# Patient Record
Sex: FEMALE | Race: WHITE | Employment: OTHER
[De-identification: names, ages, dates, MRNs, and addresses within clinical notes are randomized per-mention and may not be internally consistent; named-entity substitution may affect disease eponyms.]

---

## 2022-03-18 PROBLEM — E66.01 OBESITY, MORBID (HCC): Status: ACTIVE | Noted: 2018-01-08

## 2022-04-28 PROBLEM — I35.0 NONRHEUMATIC AORTIC VALVE STENOSIS: Status: ACTIVE | Noted: 2022-04-28

## 2022-06-08 ENCOUNTER — TELEPHONE (OUTPATIENT)
Dept: FAMILY MEDICINE CLINIC | Facility: CLINIC | Age: 72
End: 2022-06-08

## 2022-06-08 DIAGNOSIS — E11.9 TYPE 2 DIABETES MELLITUS WITHOUT COMPLICATION, WITHOUT LONG-TERM CURRENT USE OF INSULIN (HCC): Primary | ICD-10-CM

## 2022-06-15 ENCOUNTER — TELEPHONE (OUTPATIENT)
Dept: CARDIOLOGY CLINIC | Age: 72
End: 2022-06-15

## 2022-06-15 RX ORDER — METOPROLOL SUCCINATE 50 MG/1
50 TABLET, EXTENDED RELEASE ORAL DAILY
Qty: 90 TABLET | Refills: 3 | Status: SHIPPED | OUTPATIENT
Start: 2022-06-15 | End: 2022-06-27 | Stop reason: SDUPTHER

## 2022-06-15 NOTE — TELEPHONE ENCOUNTER
Requested Prescriptions     Signed Prescriptions Disp Refills    metoprolol succinate (TOPROL XL) 50 MG extended release tablet 90 tablet 3     Sig: Take 1 tablet by mouth daily     Authorizing Provider: Shellie Rosen     Ordering User: Breana Suárez

## 2022-06-20 ENCOUNTER — NURSE ONLY (OUTPATIENT)
Dept: FAMILY MEDICINE CLINIC | Facility: CLINIC | Age: 72
End: 2022-06-20

## 2022-06-20 DIAGNOSIS — E11.9 TYPE 2 DIABETES MELLITUS WITHOUT COMPLICATION, WITHOUT LONG-TERM CURRENT USE OF INSULIN (HCC): ICD-10-CM

## 2022-06-21 LAB
CREAT UR-MCNC: 23 MG/DL
MICROALBUMIN UR-MCNC: 0.61 MG/DL
MICROALBUMIN/CREAT UR-RTO: 27 MG/G

## 2022-06-22 ENCOUNTER — TELEPHONE (OUTPATIENT)
Dept: FAMILY MEDICINE CLINIC | Facility: CLINIC | Age: 72
End: 2022-06-22

## 2022-06-22 DIAGNOSIS — E11.9 TYPE 2 DIABETES MELLITUS WITHOUT COMPLICATION, WITHOUT LONG-TERM CURRENT USE OF INSULIN (HCC): Primary | ICD-10-CM

## 2022-06-22 DIAGNOSIS — I27.20 PULMONARY HTN (HCC): ICD-10-CM

## 2022-06-22 NOTE — TELEPHONE ENCOUNTER
Tried calling pt about the A1C, CMP, and Lipid panel that was missed in the lab draw.  Left message to call back

## 2022-06-23 NOTE — TELEPHONE ENCOUNTER
Spoke with pt. Pt states that when she came they could not get her labs this is why they did not get labs.

## 2022-06-24 ENCOUNTER — NURSE ONLY (OUTPATIENT)
Dept: FAMILY MEDICINE CLINIC | Facility: CLINIC | Age: 72
End: 2022-06-24

## 2022-06-24 DIAGNOSIS — E11.9 TYPE 2 DIABETES MELLITUS WITHOUT COMPLICATION, WITHOUT LONG-TERM CURRENT USE OF INSULIN (HCC): ICD-10-CM

## 2022-06-24 LAB
CHOLEST SERPL-MCNC: 148 MG/DL
HDLC SERPL-MCNC: 56 MG/DL (ref 40–60)
HDLC SERPL: 2.6 {RATIO}
LDLC SERPL CALC-MCNC: 78.4 MG/DL
TRIGL SERPL-MCNC: 68 MG/DL (ref 35–150)
VLDLC SERPL CALC-MCNC: 13.6 MG/DL (ref 6–23)

## 2022-06-25 LAB
EST. AVERAGE GLUCOSE BLD GHB EST-MCNC: 163 MG/DL
HBA1C MFR BLD: 7.3 % (ref 4.2–6.3)

## 2022-06-27 ENCOUNTER — OFFICE VISIT (OUTPATIENT)
Dept: FAMILY MEDICINE CLINIC | Facility: CLINIC | Age: 72
End: 2022-06-27
Payer: MEDICARE

## 2022-06-27 VITALS
BODY MASS INDEX: 51.21 KG/M2 | DIASTOLIC BLOOD PRESSURE: 87 MMHG | RESPIRATION RATE: 16 BRPM | OXYGEN SATURATION: 98 % | SYSTOLIC BLOOD PRESSURE: 170 MMHG | TEMPERATURE: 97.2 F | HEART RATE: 99 BPM | WEIGHT: 280 LBS

## 2022-06-27 DIAGNOSIS — L03.119 CELLULITIS OF LOWER EXTREMITY, UNSPECIFIED LATERALITY: ICD-10-CM

## 2022-06-27 DIAGNOSIS — E11.9 TYPE 2 DIABETES MELLITUS WITHOUT COMPLICATION, WITHOUT LONG-TERM CURRENT USE OF INSULIN (HCC): ICD-10-CM

## 2022-06-27 DIAGNOSIS — I27.20 PULMONARY HTN (HCC): ICD-10-CM

## 2022-06-27 DIAGNOSIS — I87.2 VENOUS STASIS DERMATITIS OF BOTH LOWER EXTREMITIES: ICD-10-CM

## 2022-06-27 DIAGNOSIS — I87.2 VENOUS INCOMPETENCE: ICD-10-CM

## 2022-06-27 DIAGNOSIS — I10 PRIMARY HYPERTENSION: ICD-10-CM

## 2022-06-27 DIAGNOSIS — Z00.00 MEDICARE ANNUAL WELLNESS VISIT, SUBSEQUENT: Primary | ICD-10-CM

## 2022-06-27 DIAGNOSIS — I50.9 CONGESTIVE HEART FAILURE, UNSPECIFIED HF CHRONICITY, UNSPECIFIED HEART FAILURE TYPE (HCC): ICD-10-CM

## 2022-06-27 DIAGNOSIS — E66.01 OBESITY, MORBID (HCC): ICD-10-CM

## 2022-06-27 DIAGNOSIS — M17.10 ARTHRITIS OF KNEE: ICD-10-CM

## 2022-06-27 PROCEDURE — 99214 OFFICE O/P EST MOD 30 MIN: CPT | Performed by: FAMILY MEDICINE

## 2022-06-27 PROCEDURE — G0439 PPPS, SUBSEQ VISIT: HCPCS | Performed by: FAMILY MEDICINE

## 2022-06-27 PROCEDURE — 3051F HG A1C>EQUAL 7.0%<8.0%: CPT | Performed by: FAMILY MEDICINE

## 2022-06-27 PROCEDURE — 1123F ACP DISCUSS/DSCN MKR DOCD: CPT | Performed by: FAMILY MEDICINE

## 2022-06-27 RX ORDER — AMLODIPINE BESYLATE 5 MG/1
5 TABLET ORAL DAILY
Qty: 90 TABLET | Refills: 3 | Status: SHIPPED | OUTPATIENT
Start: 2022-06-27

## 2022-06-27 RX ORDER — CEPHALEXIN 500 MG/1
500 CAPSULE ORAL 2 TIMES DAILY
Qty: 20 CAPSULE | Refills: 0 | Status: SHIPPED | OUTPATIENT
Start: 2022-06-27 | End: 2022-07-11

## 2022-06-27 RX ORDER — METOPROLOL SUCCINATE 50 MG/1
50 TABLET, EXTENDED RELEASE ORAL DAILY
Qty: 90 TABLET | Refills: 3 | Status: SHIPPED | OUTPATIENT
Start: 2022-06-27

## 2022-06-27 ASSESSMENT — LIFESTYLE VARIABLES: HOW OFTEN DO YOU HAVE A DRINK CONTAINING ALCOHOL: NEVER

## 2022-06-27 ASSESSMENT — PATIENT HEALTH QUESTIONNAIRE - PHQ9
SUM OF ALL RESPONSES TO PHQ QUESTIONS 1-9: 0
SUM OF ALL RESPONSES TO PHQ QUESTIONS 1-9: 0
SUM OF ALL RESPONSES TO PHQ9 QUESTIONS 1 & 2: 0
SUM OF ALL RESPONSES TO PHQ QUESTIONS 1-9: 0
2. FEELING DOWN, DEPRESSED OR HOPELESS: 0
1. LITTLE INTEREST OR PLEASURE IN DOING THINGS: 0
SUM OF ALL RESPONSES TO PHQ QUESTIONS 1-9: 0

## 2022-06-27 NOTE — PROGRESS NOTES
[address] [facility] [address]  Phone: [phone] Fax [phone]  Pastor Mcmanus MD  6/27/2022         Ms. Demar Granda  is a 73y.o.  year old  female patient who comes in to check on diabetes, hypertension, and high cholesterol. She does have a history of congestive heart failure, hypertension, pulmonary hypertension, diabetes, and morbid obesity. Is gotten to the point where it is difficult for her to walk because she is retaining fluid and has gained so much weight. The past 2 weeks ago she has noticed that her legs have been red and puffy with fluid and they have been breaking open and they have been draining some straw-colored fluid. She has not had fevers. Checks blood sugars not at all. Sugars have been running unknown. Last eye exam was this year and she apparently had a retinal hemorrhage due to untreated hypertension. Feet have no problems as far as neuropathy but she does have trouble with swelling. Did not have a flu shot this year -does not want one. Did not have a pneumonia shot -does not want one not. Did have a tetanus shot tdap 2/7/13. Has not been working on diet and exercise. Blood pressure has not been under control    Ms. Demar Granda  has  has a past medical history of Arthritis of knee, CHF (congestive heart failure) ([address] DM (diabetes mellitus) ([facility] High risk medication use, HTN (hypertension), Migraines, Pulmonary HTN ([address] and Venous incompetence. Ms. Demar Granda  has  has a past surgical history that includes hernia repair.     Ms. Demar Granda   Current Outpatient Medications   Medication Sig Dispense Refill    amLODIPine (NORVASC) 5 MG tablet Take 1 tablet by mouth daily 90 tablet 3    metoprolol succinate (TOPROL XL) 50 MG extended release tablet Take 1 tablet by mouth daily 90 tablet 3    cephALEXin (KEFLEX) 500 MG capsule Take 1 capsule by mouth 2 times daily 20 capsule 0    furosemide (LASIX) 40 MG tablet TAKE 1 TABLET EVERY MORNING AND TAKE 1/2 TABLET EVERY EVENING      metFORMIN (GLUCOPHAGE) 500 MG tablet Take 1,000 mg by mouth 2 times daily (with meals)      potassium chloride (KLOR-CON) 10 MEQ extended release tablet TAKE 1 TABLET EVERY DAY      ramipril (ALTACE) 10 MG capsule TAKE 1 CAPSULE EVERY DAY       No current facility-administered medications for this visit. Ms. Kyler Abdi   Family History   Problem Relation Age of Onset    Liver Disease Paternal Grandmother         cirrhosis - non drinker    Heart Disease Maternal Grandmother     Liver Disease Paternal Uncle         cirrhosis - nondrinker    Liver Disease Paternal Aunt         cirrhosis - nondrinker    Hypertension Mother    Gini Holder Parkinsonism Father     Heart Disease Mother     Heart Disease Paternal Grandfather    Devin George Father         copd       Ms. Ellis    Social History     Socioeconomic History    Marital status:      Spouse name: Not on file    Number of children: Not on file    Years of education: Not on file    Highest education level: Not on file   Occupational History    Not on file   Tobacco Use    Smoking status: Never Smoker    Smokeless tobacco: Never Used   Substance and Sexual Activity    Alcohol use: No    Drug use: No    Sexual activity: Not on file   Other Topics Concern    Not on file   Social History Narrative    Not on file     Social Determinants of Health     Financial Resource Strain:     Difficulty of Paying Living Expenses: Not on file   Food Insecurity:     Worried About Running Out of Food in the Last Year: Not on file    Aime of Food in the Last Year: Not on file   Transportation Needs:     Lack of Transportation (Medical): Not on file    Lack of Transportation (Non-Medical):  Not on file   Physical Activity: Inactive    Days of Exercise per Week: 0 days    Minutes of Exercise per Session: 0 min   Stress:     Feeling of Stress : Not on file   Social Connections:     Frequency of Communication with Friends and Family: Not on file    Frequency of Social Gatherings with Friends and Family: Not on file    Attends Baptism Services: Not on file    Active Member of Clubs or Organizations: Not on file    Attends Club or Organization Meetings: Not on file    Marital Status: Not on file   Intimate Partner Violence:     Fear of Current or Ex-Partner: Not on file    Emotionally Abused: Not on file    Physically Abused: Not on file    Sexually Abused: Not on file   Housing Stability:     Unable to Pay for Housing in the Last Year: Not on file    Number of [place] in the Last Year: Not on file    Unstable Housing in the Last Year: Not on file       Ms. Zachary Alonzo  has the following allergies: No Known Allergies    BP (!) 170/87   Pulse 99   Temp 97.2 °F (36.2 °C)   Resp 16   Wt 280 lb (127 kg)   SpO2 98%   BMI 51.21 kg/m²     HEENT: Normocephalic, atraumatic, pupils equal and reactive to light. Tympanic membranes intact without erythema or edema. Oropharynx clear. Neck: Supple, no masses or thyromegaly. Lungs: clear to auscultation bilaterally. CV: regular rate and rhythm, without murmurs, rubs, or gallops. Abdomen: soft, nontender, nondistended, no masses. No CVAT tenderness. Diabetic foot exam:   Left Foot:   Visual Exam: ulcer- Draining glands anteriorly around her shins consistent with venous stasis ulcers   Pulse DP: 2+ (normal)   Filament test: normal sensation   Vibratory Sensation: normal  Right Foot:   Visual Exam: ulcer- Chronic draining wounds consistent with venous stasis   Pulse DP: 2+ (normal)   Filament test: normal sensation   Vibratory Sensation: normal    No results found for this visit on 06/27/22. Ronny Rivera was seen today for leg swelling.     Diagnoses and all orders for this visit:    Medicare annual wellness visit, subsequent    Venous stasis dermatitis of both lower extremities  -     [facility]    Cellulitis of lower extremity, unspecified laterality  -     cephALEXin (KEFLEX) 500 MG capsule; Take 1 capsule by mouth 2 times daily  -     [facility]    Arthritis of knee    Type 2 diabetes mellitus without complication, without long-term current use of insulin (HCC)    Obesity, morbid (HCC)    Congestive heart failure, unspecified HF chronicity, unspecified heart failure type ([address]    Primary hypertension  -     amLODIPine (NORVASC) 5 MG tablet; Take 1 tablet by mouth daily  -     metoprolol succinate (TOPROL XL) 50 MG extended release tablet; Take 1 tablet by mouth daily    Venous incompetence    Pulmonary HTN (HCC)    Flex out for her cellulitis and referral to the wound center for the wounds on her legs. Increase Toprol daily to 50 mg as she says she did not get that prescription. Add amlodipine. Follow-up 1 to 2 months to see how she is doing. Patient counseled on diet and exercise. Luisa Whittington MD      Medicare Annual Wellness Visit    Boone Parikh is here for Leg Swelling    Assessment & Plan   Medicare annual wellness visit, subsequent  Venous stasis dermatitis of both lower extremities  -     [facility]  Cellulitis of lower extremity, unspecified laterality  -     cephALEXin (KEFLEX) 500 MG capsule; Take 1 capsule by mouth 2 times daily, Disp-20 capsule, R-0Normal  -     [facility]  Arthritis of knee  Type 2 diabetes mellitus without complication, without long-term current use of insulin (HCC)  Obesity, morbid (HCC)  Congestive heart failure, unspecified HF chronicity, unspecified heart failure type ([address]  Primary hypertension  -     amLODIPine (NORVASC) 5 MG tablet; Take 1 tablet by mouth daily, Disp-90 tablet, R-3Normal  -     metoprolol succinate (TOPROL XL) 50 MG extended release tablet;  Take 1 tablet by mouth daily, Disp-90 tablet, R-3Normal  Venous incompetence  Pulmonary HTN ([address]      Recommendations for Preventive Services Due: see orders and patient instructions/AVS.  Recommended screening schedule for the next 5-10 years is provided to the patient in written form: see Patient Instructions/AVS.     Return for Medicare Annual Wellness Visit in 1 year. Subjective     Patient's complete Health Risk Assessment and screening values have been reviewed and are found in Flowsheets. The following problems were reviewed today and where indicated follow up appointments were made and/or referrals ordered.     Positive Risk Factor Screenings with Interventions:               General Health and ACP:  General  In general, how would you say your health is?: Fair  In the past 7 days, have you experienced any of the following: New or Increased Pain, New or Increased Fatigue, Loneliness, Social Isolation, Stress or Anger?: (!) Yes  Select all that apply: (!) New or Increased Pain  Do you get the social and emotional support that you need?: Yes  Do you have a Living Will?: (!) No    Advance Directives     Power of  Living Will ACP-Advance Directive ACP-Power of     Not on File Not on File Not on File Not on File      General Health Risk Interventions:  · We will work on getting a living well    Health Habits/Nutrition:     Physical Activity: Inactive    Days of Exercise per Week: 0 days    Minutes of Exercise per Session: 0 min     Have you lost any weight without trying in the past 3 months?: No     Have you seen the dentist within the past year?: (!) No    Health Habits/Nutrition Interventions:  · Inadequate physical activity:  Has chronic wounds to keep her from walking much    Hearing/Vision:  Do you or your family notice any trouble with your hearing that hasn't been managed with hearing aids?: No  Do you have difficulty driving, watching TV, or doing any of your daily activities because of your eyesight?: (!) Yes  Have you had an eye exam within the past year?: Yes  No exam data present    Hearing/Vision Interventions:  · Vision concerns:  patient encouraged to make appointment with his/her eye specialist            Objective   Vitals:    06/27/22 1352   BP: (!) 170/87   Pulse: 99   Resp: 16   Temp: 97.2 °F (36.2 °C)   SpO2: 98%   Weight: 280 lb (127 kg)      Body mass index is 51.21 kg/m². No Known Allergies  Prior to Visit Medications    Medication Sig Taking?  Authorizing Provider   amLODIPine (NORVASC) 5 MG tablet Take 1 tablet by mouth daily Yes Luisa Whittington MD   metoprolol succinate (TOPROL XL) 50 MG extended release tablet Take 1 tablet by mouth daily Yes Luisa Whittington MD   cephALEXin (KEFLEX) 500 MG capsule Take 1 capsule by mouth 2 times daily Yes Luisa Whittington MD   furosemide (LASIX) 40 MG tablet TAKE 1 TABLET EVERY MORNING AND TAKE 1/2 TABLET EVERY EVENING Yes Ar Automatic Reconciliation   metFORMIN (GLUCOPHAGE) 500 MG tablet Take 1,000 mg by mouth 2 times daily (with meals) Yes Ar Automatic Reconciliation   potassium chloride (KLOR-CON) 10 MEQ extended release tablet TAKE 1 TABLET EVERY DAY Yes Ar Automatic Reconciliation   ramipril (ALTACE) 10 MG capsule TAKE 1 CAPSULE EVERY DAY Yes Ar Automatic Reconciliation       CareTeam (Including outside providers/suppliers regularly involved in providing care):   Patient Care Team:  Luisa Whittington MD as PCP - Jeovanny Cruz MD as PCP - [facility] Empaneled Provider     Reviewed and updated this visit:  Tobacco  Allergies  Meds  Problems  Med Hx  Surg Hx  Soc Hx  Fam Hx

## 2022-06-27 NOTE — PATIENT INSTRUCTIONS
Personalized Preventive Plan for Hussein Neely - 6/27/2022  Medicare offers a range of preventive health benefits. Some of the tests and screenings are paid in full while other may be subject to a deductible, co-insurance, and/or copay. Some of these benefits include a comprehensive review of your medical history including lifestyle, illnesses that may run in your family, and various assessments and screenings as appropriate. After reviewing your medical record and screening and assessments performed today your provider may have ordered immunizations, labs, imaging, and/or referrals for you. A list of these orders (if applicable) as well as your Preventive Care list are included within your After Visit Summary for your review. Other Preventive Recommendations:    · A preventive eye exam performed by an eye specialist is recommended every 1-2 years to screen for glaucoma; cataracts, macular degeneration, and other eye disorders. · A preventive dental visit is recommended every 6 months. · Try to get at least 150 minutes of exercise per week or 10,000 steps per day on a pedometer . · Order or download the FREE \"Exercise & Physical Activity: Your Everyday Guide\" from [employer]. Call 8-654.194.4693 or search [employer] online. · You need 8551-7622 mg of calcium and 9849-8940 IU of vitamin D per day. It is possible to meet your calcium requirement with diet alone, but a vitamin D supplement is usually necessary to meet this goal.  · When exposed to the sun, use a sunscreen that protects against both UVA and UVB radiation with an SPF of 30 or greater. Reapply every 2 to 3 hours or after sweating, drying off with a towel, or swimming. · Always wear a seat belt when traveling in a car. Always wear a helmet when riding a bicycle or motorcycle.

## 2022-06-28 ENCOUNTER — TELEPHONE (OUTPATIENT)
Dept: FAMILY MEDICINE CLINIC | Facility: CLINIC | Age: 72
End: 2022-06-28

## 2022-06-28 NOTE — TELEPHONE ENCOUNTER
STAT referral to [facility] wound center given to [facility] from by [place], [place] for Dr. Rossi Mckeon. Called on 6/27/2022 @ 3:50, had to leave a message on after hours voicemail. Called back on 6/28/2022 @ 9:19 AM and spoke to [place]. Appt scheduled at 9:30 on 7/1/2022. Pt notified @ 9:28 AM, 6/28/2022.

## 2022-06-29 NOTE — RESULT ENCOUNTER NOTE
Let patient know labs normal/good. She got her a1c down to 7.3 which is better. Keep up the good work.

## 2022-07-01 ENCOUNTER — HOSPITAL ENCOUNTER (OUTPATIENT)
Dept: WOUND CARE | Age: 72
Setting detail: RECURRING SERIES
Discharge: HOME | End: 2022-07-01
Payer: MEDICARE

## 2022-07-01 VITALS
TEMPERATURE: 98.9 F | HEART RATE: 100 BPM | SYSTOLIC BLOOD PRESSURE: 138 MMHG | HEIGHT: 62 IN | DIASTOLIC BLOOD PRESSURE: 68 MMHG | RESPIRATION RATE: 18 BRPM | BODY MASS INDEX: 50.61 KG/M2 | WEIGHT: 275 LBS

## 2022-07-01 DIAGNOSIS — L97.929 CHRONIC VENOUS HYPERTENSION WITH ULCER AND INFLAMMATION INVOLVING BOTH SIDES (HCC): Primary | ICD-10-CM

## 2022-07-01 DIAGNOSIS — L97.919 CHRONIC VENOUS HYPERTENSION WITH ULCER AND INFLAMMATION INVOLVING BOTH SIDES (HCC): Primary | ICD-10-CM

## 2022-07-01 DIAGNOSIS — I87.333 CHRONIC VENOUS HYPERTENSION WITH ULCER AND INFLAMMATION INVOLVING BOTH SIDES (HCC): Primary | ICD-10-CM

## 2022-07-01 PROCEDURE — 29581 APPL MULTLAYER CMPRN SYS LEG: CPT

## 2022-07-01 PROCEDURE — 99203 OFFICE O/P NEW LOW 30 MIN: CPT | Performed by: SURGERY

## 2022-07-01 RX ORDER — LIDOCAINE HYDROCHLORIDE 20 MG/ML
JELLY TOPICAL ONCE
Status: CANCELLED | OUTPATIENT
Start: 2022-07-01 | End: 2022-07-01

## 2022-07-01 RX ORDER — LIDOCAINE 50 MG/G
OINTMENT TOPICAL ONCE
Status: CANCELLED | OUTPATIENT
Start: 2022-07-01 | End: 2022-07-01

## 2022-07-01 RX ORDER — LIDOCAINE 40 MG/G
CREAM TOPICAL ONCE
Status: CANCELLED | OUTPATIENT
Start: 2022-07-01 | End: 2022-07-01

## 2022-07-01 RX ORDER — GINSENG 100 MG
CAPSULE ORAL ONCE
Status: CANCELLED | OUTPATIENT
Start: 2022-07-01 | End: 2022-07-01

## 2022-07-01 RX ORDER — LIDOCAINE HYDROCHLORIDE 40 MG/ML
SOLUTION TOPICAL ONCE
Status: CANCELLED | OUTPATIENT
Start: 2022-07-01 | End: 2022-07-01

## 2022-07-01 RX ORDER — BACITRACIN, NEOMYCIN, POLYMYXIN B 400; 3.5; 5 [USP'U]/G; MG/G; [USP'U]/G
OINTMENT TOPICAL ONCE
Status: CANCELLED | OUTPATIENT
Start: 2022-07-01 | End: 2022-07-01

## 2022-07-01 RX ORDER — BETAMETHASONE DIPROPIONATE 0.05 %
OINTMENT (GRAM) TOPICAL ONCE
Status: CANCELLED | OUTPATIENT
Start: 2022-07-01 | End: 2022-07-01

## 2022-07-01 RX ORDER — CLOBETASOL PROPIONATE 0.5 MG/G
OINTMENT TOPICAL ONCE
Status: CANCELLED | OUTPATIENT
Start: 2022-07-01 | End: 2022-07-01

## 2022-07-01 RX ORDER — GENTAMICIN SULFATE 1 MG/G
OINTMENT TOPICAL ONCE
Status: CANCELLED | OUTPATIENT
Start: 2022-07-01 | End: 2022-07-01

## 2022-07-01 RX ORDER — BACITRACIN ZINC AND POLYMYXIN B SULFATE 500; 1000 [USP'U]/G; [USP'U]/G
OINTMENT TOPICAL ONCE
Status: CANCELLED | OUTPATIENT
Start: 2022-07-01 | End: 2022-07-01

## 2022-07-01 ASSESSMENT — PAIN DESCRIPTION - LOCATION: LOCATION: LEG

## 2022-07-01 ASSESSMENT — PAIN DESCRIPTION - DESCRIPTORS: DESCRIPTORS: BURNING;STABBING

## 2022-07-01 ASSESSMENT — PAIN DESCRIPTION - ORIENTATION: ORIENTATION: RIGHT;LEFT

## 2022-07-01 ASSESSMENT — PAIN SCALES - GENERAL: PAINLEVEL_OUTOF10: 6

## 2022-07-01 NOTE — WOUND CARE
Multilayer Compression Wrap   (Not Unna) Below the Knee    NAME:  Sigrid Monday  YOB: 1950  MEDICAL RECORD NUMBER:  [id number]  DATE:  7/1/2022    Multilayer compression wrap: Removed old Multilayer wrap if indicated and wash leg with mild soap/water. Applied moisturizing agent to dry skin as needed. Applied primary and secondary dressing as ordered. Applied multilayered dressing below the knee to right lower leg. Applied multilayered dressing below the knee to left lower leg. Instructed patient/caregiver not to remove dressing and to keep it clean and dry. Instructed patient/caregiver on complications to report to provider, such as pain, numbness in toes, heavy drainage, and slippage of dressing. Instructed patient on purpose of compression dressing and on activity and exercise recommendations.       Electronically signed by Brenda Keating RN on 7/1/2022 at 10:19 AM

## 2022-07-01 NOTE — FLOWSHEET NOTE
07/01/22 0949   Wound 07/01/22 Pretibial Right   Date First Assessed/Time First Assessed: 07/01/22 0945   Present on Hospital Admission: Yes  Primary Wound Type: Venous Ulcer  Location: Pretibial  Wound Location Orientation: Right   Wound Image    Wound Etiology Venous   Dressing Status Old drainage noted   Wound Cleansed Soap and water   Wound Length (cm) 10.5 cm   Wound Width (cm) 15 cm   Wound Depth (cm) 0.1 cm   Wound Surface Area (cm^2) 157.5 cm^2   Wound Volume (cm^3) 15.75 cm^3   Wound Assessment Pink/red   Drainage Amount Large   Drainage Description Serous   Odor None   Shanice-wound Assessment Edematous; Maceration; Hypopigmented   Wound Thickness Description not for Pressure Injury Full thickness   Wound 07/01/22 Pretibial Left   Date First Assessed/Time First Assessed: 07/01/22 0949   Present on Hospital Admission: Yes  Primary Wound Type: Venous Ulcer  Location: Pretibial  Wound Location Orientation: Left   Wound Image    Wound Etiology Venous   Dressing Status Old drainage noted   Wound Cleansed Soap and water   Wound Length (cm) 11 cm   Wound Width (cm) 20 cm   Wound Depth (cm) 0.1 cm   Wound Surface Area (cm^2) 220 cm^2   Wound Volume (cm^3) 22 cm^3   Wound Assessment Pink/red   Drainage Amount Large   Drainage Description Serous   Odor None   Shanice-wound Assessment Edematous; Hypopigmented; Maceration   Wound Thickness Description not for Pressure Injury Full thickness

## 2022-07-01 NOTE — WOUND CARE
Discharge Instructions for  Gildardo Bowser  [address] [place] [address]  [address], [address]  Phone [phone]   Fax [phone]      NAME:  Tawana Metzger OF BIRTH:  1950  MEDICAL RECORD NUMBER:  [id number]  DATE:  7/1/2022    Return Appointment:   1 week with Dr. Abdelrahman Rowland MD      Instructions: Bilateral lower leg wounds:  Clean wounds and legs with soap and water. Apply alginate with silver. Cut product to wound size and apply to wound bed. Cover with super absorbent pads (I.e. exudry). Wrap lower leg with 2 layer compression system. *Gently compress and gradually increase due to patient's congestive heart failure. Change dressing 3 times weekly. [facility] for dressing change and wound assessment 3 TIMES/WEEK     Do not cut compression wraps off. If wraps become too loose or slide down, call wound center to make an appointment for dressing change. If wraps become too tight, try elevating your legs to assist fluid drainage. Elevate legs when sitting. Avoid prolonged standing or sitting with legs in dependent position. Eliminate salt intake as this promotes fluid retention and swelling. Take diuretic (fluid pill) as instructed by your doctor. Increase protein intake to promote wound healing. Alexander and Ensure are examples of protein supplements. Wound healing is greatly slowed when glucose levels are greater than 200. Monitor glucose levels to ensure tight glucose control. Make an effort to sleep in your bed. A bed wedge may help with shortness of breath when lying down. Should you experience increased redness, swelling, pain, foul odor, size of wound(s), or have a temperature over 101 degrees please contact the [address] [place] [address] Road at [phone] or if after hours contact your primary care physician or go to the hospital emergency department.     PLEASE NOTE: IF YOU ARE UNABLE TO OBTAIN WOUND SUPPLIES, CONTINUE TO USE THE SUPPLIES YOU HAVE AVAILABLE UNTIL YOU ARE ABLE TO REACH US. IT IS MOST IMPORTANT TO KEEP THE WOUND COVERED AT ALL TIMES.     Electronically signed Aneudy Rodriguez RN on 7/1/2022 at 10:13 AM

## 2022-07-01 NOTE — PROGRESS NOTES
[address]   Progress Note     Hyun Layton  MEDICAL RECORD NUMBER:  [id number]  AGE: 73 y.o. GENDER: female  : 1950  EPISODE DATE:  2022    Subjective:     Chief Complaint   Patient presents with    Wound Infection     bilateral lower leg wounds        Initial evaluation of ulcers both lower extremities     Assessment:     Chronic venous hypertension with ulcer and inflammation involving both lower extremities    Problem List Items Addressed This Visit        Circulatory    Chronic venous hypertension with ulcer and inflammation involving both sides ([address]          Wound evaluation: Bilateral lower extremity venous ulcers weeping serous fluid, small amount of slough, no evidence for infection. Patient has high risk for morbidity and mortality due to conditions affecting wound healing discussed or addressed today: Obesity, aortic stenosis, diabetes, venous insufficiency  Education and discussion held with patient regarding these disease processes especially issues related to the wound(s). Plan:   Plan to start dressing changes with silver alginate and 2 layer compression wrap. Encouraged protein supplementation. Avoid walking or standing for prolonged periods, keep legs elevated when possible. Follow-up in 1 week for recheck. Wound 22 Pretibial Right (Active)   Wound Image   22 0949   Wound Etiology Venous 22 09   Dressing Status Old drainage noted 22 09   Wound Cleansed Soap and water 22 09   Wound Length (cm) 10.5 cm 22 0949   Wound Width (cm) 15 cm 22 0949   Wound Depth (cm) 0.1 cm 22 09   Wound Surface Area (cm^2) 157.5 cm^2 22 09   Wound Volume (cm^3) 15.75 cm^3 22 09   Wound Assessment Pink/red 22   Drainage Amount Large 22   Drainage Description Serous 22   Odor None 22   Shanice-wound Assessment Edematous; Maceration; Hypopigmented 22 course of oral antibiotics. She denies having any fever or chills nor has she had any purulent drainage. Patient has never had any similar problems in the past.  She does report some mild tenderness associated with the ulcerations. Patient does have diabetes and reports that her last hemoglobin A1c was 7. She denies use of cigarettes. Past history is pertinent for moderate aortic stenosis, diabetes, and obesity. The patient has recently lost 30 pounds by changing her diet. History of Wound Context: Per original history and physical on this patient. Objective:    /68   Pulse 100   Temp 98.9 °F (37.2 °C) (Oral)   Resp 18   Ht 5' 2\" (1.575 m)   Wt 275 lb (124.7 kg)   BMI 50.30 kg/m²   Wt Readings from Last 3 Encounters:   07/01/22 275 lb (124.7 kg)   06/27/22 280 lb (127 kg)   04/28/22 282 lb (127.9 kg)       PHYSICAL EXAM  General: alert and oriented to person, place and time, obese and well nourished, and in no acute distress. Skin: warm and dry, no rash. Head: normocephalic and atraumatic. Eyes: extraocular eye movements intact, conjunctivae normal, and sclera anicteric. ENT: hearing grossly normal bilaterally. Normal appearance. Respiratory: no chest wall tenderness. no respiratory distress. GI: abdomen soft, non-tender and non-distended, benign. Musculoskeletal: normal range of motion of joints. Nontender calves. No cyanosis. Edema 4+. Neurologic: Speech normal. No focal deficits. Mental status normal.  Cardiovascular: Regular rate and rhythm with harsh grade 3 systolic murmur. Unable to palpate pedal pulses due to edema however triphasic flow was identified with Doppler.     PAST MEDICAL HISTORY        Diagnosis Date    Arthritis of knee 1/11/2013    [facility]F [facility]congestive [facility] failure) ([facility] 1/11/2013    DM (diabetes mellitus) ([facility] 1/11/2013    High risk medication use 1/11/2013    HTN (hypertension) 1/11/2013    Migraines 1/11/2013    Pulmonary HTN ([facility] 1/11/2013    Venous incompetence 1/11/2013       PAST SURGICAL HISTORY    Past Surgical History:   Procedure Laterality Date    HERNIA REPAIR         FAMILY HISTORY    Family History   Problem Relation Age of Onset    Liver Disease Paternal Grandmother         cirrhosis - non drinker    Heart Disease Maternal Grandmother     Liver Disease Paternal Uncle         cirrhosis - nondrinker    Liver Disease Paternal Aunt         cirrhosis - nondrinker    Hypertension Mother     Parkinsonism Father     Heart Disease Mother     Heart Disease Paternal Grandfather     Lung Disease Father         copd       SOCIAL HISTORY    Social History     Tobacco Use    Smoking status: Never Smoker    Smokeless tobacco: Never Used   Substance Use Topics    Alcohol use: No    Drug use: No       ALLERGIES    No Known Allergies    MEDICATIONS    Current Outpatient Medications on File Prior to Encounter   Medication Sig Dispense Refill    amLODIPine (NORVASC) 5 MG tablet Take 1 tablet by mouth daily 90 tablet 3    metoprolol succinate (TOPROL XL) 50 MG extended release tablet Take 1 tablet by mouth daily 90 tablet 3    cephALEXin (KEFLEX) 500 MG capsule Take 1 capsule by mouth 2 times daily 20 capsule 0    furosemide (LASIX) 40 MG tablet TAKE 1 TABLET EVERY MORNING AND TAKE 1/2 TABLET EVERY EVENING      metFORMIN (GLUCOPHAGE) 500 MG tablet Take 1,000 mg by mouth 2 times daily (with meals)      potassium chloride (KLOR-CON) 10 MEQ extended release tablet TAKE 1 TABLET EVERY DAY      ramipril (ALTACE) 10 MG capsule TAKE 1 CAPSULE EVERY DAY       No current facility-administered medications on file prior to encounter. REVIEW OF SYSTEMS  A comprehensive review of systems was negative except for: Respiratory: positive for dyspnea on exertion    Written patient dismissal instructions given to patient and signed by patient or POA.          Discharge Instructions         Discharge Instructions for  Gildardo Bowser  [address] [id number] [facility] [address], [address]  Phone [phone]   Fax [phone]        NAME:  Yanci Collazo  YOB: 1950  MEDICAL RECORD NUMBER:  [id number]  DATE:  7/1/2022     Return Appointment:   1 week with Dr. Francine Mitchell MD        Instructions: Bilateral lower leg wounds:  Clean wounds and legs with soap and water. Apply alginate with silver. Cut product to wound size and apply to wound bed. Cover with super absorbent pads (I.e. exudry). Wrap lower leg with 2 layer compression system. *Gently compress and gradually increase due to patient's congestive heart failure. Change dressing 3 times weekly.     [facility] for dressing change and wound assessment 3 TIMES/WEEK      Do not cut compression wraps off. If wraps become too loose or slide down, call wound center to make an appointment for dressing change. If wraps become too tight, try elevating your legs to assist fluid drainage. Elevate legs when sitting. Avoid prolonged standing or sitting with legs in dependent position. Eliminate salt intake as this promotes fluid retention and swelling. Take diuretic (fluid pill) as instructed by your doctor. Increase protein intake to promote wound healing. Alexander and Ensure are examples of protein supplements. Wound healing is greatly slowed when glucose levels are greater than 200. Monitor glucose levels to ensure tight glucose control. Make an effort to sleep in your bed. A bed wedge may help with shortness of breath when lying down.        Should you experience increased redness, swelling, pain, foul odor, size of wound(s), or have a temperature over 101 degrees please contact the [address] [place] [address] Road at [phone] or if after hours contact your primary care physician or go to the hospital emergency department.     PLEASE NOTE: IF YOU ARE UNABLE TO OBTAIN WOUND SUPPLIES, CONTINUE TO USE THE SUPPLIES YOU HAVE AVAILABLE UNTIL YOU ARE ABLE TO [facility].  IT IS MOST IMPORTANT TO KEEP THE WOUND COVERED AT ALL TIMES.     Electronically signed Teodora Gill RN on 7/1/2022 at 10:13 AM           Thank you very much for the opportunity to participate in this patient's care. Electronically signed by Shellie Abdi MD on 7/1/22 at 10:26 AM EDT     TIME:  If total time for today's E/M service is used for level of service it is documented below.     This time includes physician non-face-to-face service time visit on the date of service and includes    Preparing to see the patient (eg, review of tests)  Obtaining and/or reviewing separately obtained history  Performing a medically necessary appropriate examination and/or evaluation  Counseling and educating the patient/family/caregiver  Ordering medications, tests, or procedures  Referring and communicating with other health care professionals as needed  Documenting clinical information in the electronic or other health record  Independently interpreting results (not reported separately) and communicating results to the patient/family/caregiver  Care coordination (not reported separately)    E/M time = 30 Minutes

## 2022-07-11 ENCOUNTER — HOSPITAL ENCOUNTER (OUTPATIENT)
Dept: WOUND CARE | Age: 72
Setting detail: RECURRING SERIES
Discharge: HOME | End: 2022-07-11
Payer: MEDICARE

## 2022-07-11 VITALS
RESPIRATION RATE: 18 BRPM | HEART RATE: 94 BPM | DIASTOLIC BLOOD PRESSURE: 74 MMHG | SYSTOLIC BLOOD PRESSURE: 143 MMHG | WEIGHT: 271.2 LBS | HEIGHT: 62 IN | TEMPERATURE: 97.6 F | BODY MASS INDEX: 49.9 KG/M2

## 2022-07-11 DIAGNOSIS — L97.919 CHRONIC VENOUS HYPERTENSION WITH ULCER AND INFLAMMATION INVOLVING BOTH SIDES (HCC): Primary | ICD-10-CM

## 2022-07-11 DIAGNOSIS — L97.929 CHRONIC VENOUS HYPERTENSION WITH ULCER AND INFLAMMATION INVOLVING BOTH SIDES (HCC): Primary | ICD-10-CM

## 2022-07-11 DIAGNOSIS — I87.333 CHRONIC VENOUS HYPERTENSION WITH ULCER AND INFLAMMATION INVOLVING BOTH SIDES (HCC): Primary | ICD-10-CM

## 2022-07-11 PROCEDURE — 29581 APPL MULTLAYER CMPRN SYS LEG: CPT

## 2022-07-11 PROCEDURE — 99213 OFFICE O/P EST LOW 20 MIN: CPT | Performed by: SURGERY

## 2022-07-11 RX ORDER — BACITRACIN ZINC AND POLYMYXIN B SULFATE 500; 1000 [USP'U]/G; [USP'U]/G
OINTMENT TOPICAL ONCE
Status: CANCELLED | OUTPATIENT
Start: 2022-07-11 | End: 2022-07-11

## 2022-07-11 RX ORDER — CLOBETASOL PROPIONATE 0.5 MG/G
OINTMENT TOPICAL ONCE
Status: CANCELLED | OUTPATIENT
Start: 2022-07-11 | End: 2022-07-11

## 2022-07-11 RX ORDER — GINSENG 100 MG
CAPSULE ORAL ONCE
Status: CANCELLED | OUTPATIENT
Start: 2022-07-11 | End: 2022-07-11

## 2022-07-11 RX ORDER — BETAMETHASONE DIPROPIONATE 0.05 %
OINTMENT (GRAM) TOPICAL ONCE
Status: CANCELLED | OUTPATIENT
Start: 2022-07-11 | End: 2022-07-11

## 2022-07-11 RX ORDER — GENTAMICIN SULFATE 1 MG/G
OINTMENT TOPICAL ONCE
Status: CANCELLED | OUTPATIENT
Start: 2022-07-11 | End: 2022-07-11

## 2022-07-11 RX ORDER — LIDOCAINE HYDROCHLORIDE 40 MG/ML
SOLUTION TOPICAL ONCE
Status: CANCELLED | OUTPATIENT
Start: 2022-07-11 | End: 2022-07-11

## 2022-07-11 RX ORDER — BACITRACIN, NEOMYCIN, POLYMYXIN B 400; 3.5; 5 [USP'U]/G; MG/G; [USP'U]/G
OINTMENT TOPICAL ONCE
Status: CANCELLED | OUTPATIENT
Start: 2022-07-11 | End: 2022-07-11

## 2022-07-11 RX ORDER — LIDOCAINE 40 MG/G
CREAM TOPICAL ONCE
Status: CANCELLED | OUTPATIENT
Start: 2022-07-11 | End: 2022-07-11

## 2022-07-11 RX ORDER — LIDOCAINE HYDROCHLORIDE 20 MG/ML
JELLY TOPICAL ONCE
Status: CANCELLED | OUTPATIENT
Start: 2022-07-11 | End: 2022-07-11

## 2022-07-11 RX ORDER — LIDOCAINE 50 MG/G
OINTMENT TOPICAL ONCE
Status: CANCELLED | OUTPATIENT
Start: 2022-07-11 | End: 2022-07-11

## 2022-07-11 NOTE — WOUND CARE
Discharge Instructions for  Gildardo Bowser  [address] [facility] [address]  [address], [address]  Phone [phone]   Fax [phone]      NAME:  Zuleika De Leon OF BIRTH:  1950  MEDICAL RECORD NUMBER:  [id number]  DATE:  7/11/2022    Return Appointment:   2 weeks with Dr. Claudia Jacobo MD      Instructions: Bilateral lower leg wounds:  Clean wounds and legs with soap and water. Xeroform- apply to wound bed. Cover with ABD pads pr super absorbent pads (I.e. exudry). 2 layer compression with wound and lower extremity assessment. If there is any problem with the dressing (too tight, slides down, etc.) Patient to return to wound clinic to have re-wrapped by clinician. *Gently compress and gradually increase due to patient's congestive heart failure. Change dressing 2 times weekly.     [facility] for dressing change and wound assessment 2 TIMES/WEEK      Elevate legs when sitting. Avoid prolonged standing or sitting with legs in dependent position. Eliminate salt intake as this promotes fluid retention and swelling. Take diuretic (fluid pill) as instructed by your doctor. Continue taking protein shakes to promote wound healing. Wound healing is greatly slowed when glucose levels are greater than 200. Monitor glucose levels to ensure tight glucose control. Make an effort to sleep in your bed. A bed wedge may help with shortness of breath when lying down.           Should you experience increased redness, swelling, pain, foul odor, size of wound(s), or have a temperature over 101 degrees please contact the [address] [place] [address] Road at [phone] or if after hours contact your primary care physician or go to the hospital emergency department. PLEASE NOTE: IF YOU ARE UNABLE TO OBTAIN WOUND SUPPLIES, CONTINUE TO USE THE SUPPLIES YOU HAVE AVAILABLE UNTIL YOU ARE ABLE TO REACH US. IT IS MOST IMPORTANT TO KEEP THE WOUND COVERED AT ALL TIMES.     Electronically signed Kasandra Taylor Ranjana Pinto, [address] [place] [address],[address] on 7/11/2022 at 11:04 AM

## 2022-07-11 NOTE — WOUND CARE
Multilayer Compression Wrap   (Not Unna) Below the Knee    NAME:  Antwon Simpson  YOB: 1950  MEDICAL RECORD NUMBER:  [id number]  DATE:  7/11/2022    Multilayer compression wrap: Removed old Multilayer wrap if indicated and wash leg with mild soap/water. Applied multilayered dressing below the knee to right lower leg. Applied multilayered dressing below the knee to left lower leg. Instructed patient/caregiver not to remove dressing and to keep it clean and dry. Instructed patient/caregiver on complications to report to provider, such as pain, numbness in toes, heavy drainage, and slippage of dressing. Instructed patient on purpose of compression dressing and on activity and exercise recommendations.       Electronically signed by Sergey Huerta PT, [facility] on 7/11/2022 at 11:35 AM

## 2022-07-11 NOTE — FLOWSHEET NOTE
07/11/22 1053   Right Leg Edema Point of Measurement   Leg circumference 53 cm   Ankle circumference 24 cm   Foot circumference 24.5 cm   Compression Therapy 2 layer compression wrap   Left Leg Edema Point of Measurement   Leg circumference 48 cm   Ankle circumference 24 cm   Foot circumference 25.5 cm   Compression Therapy 2 layer compression wrap   Wound 07/01/22 Pretibial Right   Date First Assessed/Time First Assessed: 07/01/22 0945   Present on Hospital Admission: Yes  Primary Wound Type: Venous Ulcer  Location: Pretibial  Wound Location Orientation: Right   Wound Image    Wound Etiology Venous   Dressing Status Old drainage noted   Wound Cleansed Soap and water   Wound Length (cm) 5 cm   Wound Width (cm) 7 cm   Wound Depth (cm) 0.1 cm   Wound Surface Area (cm^2) 35 cm^2   Change in Wound Size % (l*w) 77.78   Wound Volume (cm^3) 3.5 cm^3   Wound Healing % 78   Wound Assessment Pink/red   Drainage Amount Small   Drainage Description Serosanguinous   Odor None   Shanice-wound Assessment Dry/flaky;Edematous   Wound Thickness Description not for Pressure Injury Full thickness   Wound 07/01/22 Pretibial Left   Date First Assessed/Time First Assessed: 07/01/22 0949   Present on Hospital Admission: Yes  Primary Wound Type: Venous Ulcer  Location: Pretibial  Wound Location Orientation: Left   Wound Image    Wound Etiology Venous   Dressing Status Old drainage noted   Wound Cleansed Soap and water   Wound Length (cm) 6.8 cm   Wound Width (cm) 5 cm   Wound Depth (cm) 0.1 cm   Wound Surface Area (cm^2) 34 cm^2   Change in Wound Size % (l*w) 84.55   Wound Volume (cm^3) 3.4 cm^3   Wound Healing % 85   Wound Assessment Pink/red   Drainage Amount Small   Drainage Description Serosanguinous   Odor None   Shanice-wound Assessment Edematous;Dry/flaky   Wound Thickness Description not for Pressure Injury Full thickness

## 2022-07-11 NOTE — PROGRESS NOTES
[address]   Progress Note     Gabby Delacruz  MEDICAL RECORD NUMBER:  [id number]  AGE: 81 y.o. GENDER: female  : 1950  EPISODE DATE:  2022    Subjective:     Chief Complaint   Patient presents with    Wound Check     Bilateral lower leg wounds        Patient without any new wound care issues. and  Has been tolerating wound care dressings without problems. Assessment:     Chronic venous hypertension with ulcer and inflammation involving both lower extremities, much improved    Problem List Items Addressed This Visit        Circulatory    Chronic venous hypertension with ulcer and inflammation involving both sides ([facility])          Wound evaluation: Venous ulcers both lower extremities much improved with new epithelialization and pink base, no sign of infection. Measurements improved. Patient has moderate risk for morbidity and mortality due to conditions affecting wound healing discussed or addressed today: Venous insufficiency  Education and discussion held with patient regarding these disease processes especially issues related to the wound(s). Plan:   Plan to switch to dressing changes with Xeroform gauze and 2 layer compression wrap twice a week, continue protein supplementation, follow-up in 2 weeks for recheck.   Wound 22 Pretibial Right (Active)   Wound Image   22 1053   Wound Etiology Venous 22 1053   Dressing Status Old drainage noted 22 1053   Wound Cleansed Soap and water 22 1053   Wound Length (cm) 5 cm 22 1053   Wound Width (cm) 7 cm 22 1053   Wound Depth (cm) 0.1 cm 22 1053   Wound Surface Area (cm^2) 35 cm^2 22 1053   Change in Wound Size % (l*w) 77.78 22 1053   Wound Volume (cm^3) 3.5 cm^3 22 1053   Wound Healing % 78 22 1053   Wound Assessment Pink/red 22 1053   Drainage Amount Small 22 1053   Drainage Description Serosanguinous 22 1053   Odor None 22 receiving dressing changes with silver alginate and 2 layer compression wrap. On today's visit she is without complaints. She reports some mild tenderness around the ulcers but otherwise has been doing well. She has not had any redness or purulent drainage. No significant changes in overall health since previous visit. Patient is taking protein supplements. Objective:    BP (!) 143/74   Pulse 94   Temp 97.6 °F (36.4 °C) (Oral)   Resp 18   Ht 5' 2\" (1.575 m)   Wt 271 lb 3.2 oz (123 kg)   BMI 49.60 kg/m²   Wt Readings from Last 3 Encounters:   07/11/22 271 lb 3.2 oz (123 kg)   07/01/22 275 lb (124.7 kg)   06/27/22 280 lb (127 kg)       PHYSICAL EXAM  General: alert and oriented to person, place and time, well-developed and well nourished, and in no acute distress. Skin: warm and dry, no rash. Head: normocephalic and atraumatic. Eyes: extraocular eye movements intact, conjunctivae normal, and sclera anicteric. ENT: hearing grossly normal bilaterally. Normal appearance. Respiratory: no chest wall tenderness. no respiratory distress. GI: abdomen soft, non-tender and non-distended, benign. Musculoskeletal: normal range of motion of joints. Nontender calves. No cyanosis. Edema trace. Neurologic: Speech normal. No focal deficits.  Mental status normal.     PAST MEDICAL HISTORY        Diagnosis Date    Arthritis of knee 1/11/2013    CHF (congestive heart failure) ([facility] [address] 1/11/2013    DM (diabetes mellitus) ([facility] [address] 1/11/2013    High risk medication use 1/11/2013    HTN (hypertension) 1/11/2013    Migraines 1/11/2013    Pulmonary HTN ([facility] [address] 1/11/2013    Venous incompetence 1/11/2013       PAST SURGICAL HISTORY    Past Surgical History:   Procedure Laterality Date    HERNIA REPAIR         FAMILY HISTORY    Family History   Problem Relation Age of Onset    Liver Disease Paternal Grandmother         cirrhosis - non drinker    Heart Disease Maternal Grandmother     Liver Disease Paternal Uncle cirrhosis - nondrinker    Liver Disease Paternal Aunt         cirrhosis - nondrinker    Hypertension Mother     Parkinsonism Father     Heart Disease Mother     Heart Disease Paternal Grandfather     Lung Disease Father         copd       SOCIAL HISTORY    Social History     Tobacco Use    Smoking status: Never Smoker    Smokeless tobacco: Never Used   Substance Use Topics    Alcohol use: No    Drug use: No       ALLERGIES    No Known Allergies    MEDICATIONS    Current Outpatient Medications on File Prior to Encounter   Medication Sig Dispense Refill    amLODIPine (NORVASC) 5 MG tablet Take 1 tablet by mouth daily 90 tablet 3    metoprolol succinate (TOPROL XL) 50 MG extended release tablet Take 1 tablet by mouth daily 90 tablet 3    furosemide (LASIX) 40 MG tablet TAKE 1 TABLET EVERY MORNING AND TAKE 1/2 TABLET EVERY EVENING      metFORMIN (GLUCOPHAGE) 500 MG tablet Take 1,000 mg by mouth 2 times daily (with meals)      potassium chloride (KLOR-CON) 10 MEQ extended release tablet TAKE 1 TABLET EVERY DAY      ramipril (ALTACE) 10 MG capsule TAKE 1 CAPSULE EVERY DAY       No current facility-administered medications on file prior to encounter. REVIEW OF SYSTEMS  A comprehensive review of systems was negative except for: None    Written patient dismissal instructions given to patient and signed by patient or POA. Thank you very much for the opportunity to participate in this patient's care. Electronically signed by Surendra Arreguin MD on 7/11/22 at 11:25 AM EDT     TIME:  If total time for today's E/M service is used for level of service it is documented below.     This time includes physician non-face-to-face service time visit on the date of service and includes    Preparing to see the patient (eg, review of tests)  Obtaining and/or reviewing separately obtained history  Performing a medically necessary appropriate examination and/or evaluation  Counseling and educating the patient/family/caregiver  Ordering medications, tests, or procedures  Referring and communicating with other health care professionals as needed  Documenting clinical information in the electronic or other health record  Independently interpreting results (not reported separately) and communicating results to the patient/family/caregiver  Care coordination (not reported separately)    E/M time = 20 Minutes

## 2022-07-13 LAB
ALBUMIN SERPL-MCNC: 3.9 G/DL (ref 3.5–5)
ALBUMIN/GLOB SERPL: 1 {RATIO} (ref 1.1–2.2)
ALP SERPL-CCNC: 103 U/L (ref 50–136)
ALT SERPL-CCNC: 29 U/L (ref 30–65)
ANION GAP SERPL CALC-SCNC: 9 MMOL/L (ref 5–15)
AST SERPL-CCNC: 17 U/L (ref 15–37)
BILIRUB SERPL-MCNC: 0.5 MG/DL
BUN SERPL-MCNC: 10 MG/DL (ref 6–20)
CALCIUM SERPL-MCNC: 10.2 MG/DL (ref 8.5–10.1)
CHLORIDE SERPL-SCNC: 101 MMOL/L (ref 97–108)
CO2 SERPL-SCNC: 26 MMOL/L (ref 21–32)
CREAT SERPL-MCNC: 0.8 MG/DL (ref 0.55–1.02)
GLOBULIN SER CALC-MCNC: 3.9 G/DL (ref 2–4)
GLUCOSE SERPL-MCNC: 130 MG/DL (ref 50–100)
POTASSIUM SERPL-SCNC: 4.2 MMOL/L (ref 3.5–5.1)
PROT SERPL-MCNC: 7.8 G/DL (ref 6.4–8.2)
SODIUM SERPL-SCNC: 136 MMOL/L (ref 136–145)

## 2022-07-25 ENCOUNTER — HOSPITAL ENCOUNTER (OUTPATIENT)
Dept: WOUND CARE | Age: 72
Setting detail: RECURRING SERIES
Discharge: HOME | End: 2022-07-25
Payer: MEDICARE

## 2022-07-25 VITALS
RESPIRATION RATE: 18 BRPM | HEART RATE: 70 BPM | DIASTOLIC BLOOD PRESSURE: 66 MMHG | SYSTOLIC BLOOD PRESSURE: 140 MMHG | TEMPERATURE: 98.1 F

## 2022-07-25 PROCEDURE — 29581 APPL MULTLAYER CMPRN SYS LEG: CPT

## 2022-07-25 PROCEDURE — 99213 OFFICE O/P EST LOW 20 MIN: CPT | Performed by: SURGERY

## 2022-07-25 ASSESSMENT — PAIN DESCRIPTION - LOCATION: LOCATION: LEG

## 2022-07-25 ASSESSMENT — PAIN SCALES - GENERAL: PAINLEVEL_OUTOF10: 0

## 2022-07-25 NOTE — WOUND CARE
Multilayer Compression Wrap   (Not Unna) Below the Knee    NAME:  Tessa Liao  YOB: 1950  MEDICAL RECORD NUMBER:  [id number]  DATE:  7/25/2022    Multilayer compression wrap: Removed old Multilayer wrap if indicated and wash leg with mild soap/water. Applied moisturizing agent to dry skin as needed. Applied primary and secondary dressing as ordered. Applied multilayered dressing below the knee to right lower leg. Applied multilayered dressing below the knee to left lower leg. Instructed patient/caregiver not to remove dressing and to keep it clean and dry. Instructed patient/caregiver on complications to report to provider, such as pain, numbness in toes, heavy drainage, and slippage of dressing. Instructed patient on purpose of compression dressing and on activity and exercise recommendations. Patient tolerated procedure well with no impairment to circulation noted. Electronically signed by Katerine Moon.  Denia Mabry on 7/25/2022 at 11:39 AM

## 2022-07-25 NOTE — PROGRESS NOTES
[address]   Progress Note     [facility]  MEDICAL RECORD NUMBER:  [id number]  AGE: 81 y.o. GENDER: female  : 1950  EPISODE DATE:  2022    Subjective:     Chief Complaint   Patient presents with    Wound Check     Bilateral lower extremities         Has been tolerating wound care dressings without problems. Assessment:     Chronic venous hypertension with ulcer and inflammation involving both lower extremities, much improved    Problem List Items Addressed This Visit    None      Wound evaluation: Venous ulcers both lower extremities almost completely healed, no sign of infection. Patient has moderate risk for morbidity and mortality due to conditions affecting wound healing discussed or addressed today: Venous insufficiency  Education and discussion held with patient regarding these disease processes especially issues related to the wound(s). Plan:   Plan to continue dressing changes with Xeroform gauze and 2 layer compression wrap twice a week, continue protein supplementation, order Velcro wraps, follow-up in 2 weeks for recheck.   Wound 22 Pretibial Right (Active)   Wound Image   22 1123   Wound Etiology Venous 22 1123   Dressing Status Old drainage noted 22 1123   Wound Cleansed Soap and water 22 1123   Wound Length (cm) 2 cm 22 1123   Wound Width (cm) 5 cm 22 1123   Wound Depth (cm) 0.1 cm 22 1123   Wound Surface Area (cm^2) 10 cm^2 22 1123   Change in Wound Size % (l*w) 93.65 22 1123   Wound Volume (cm^3) 1 cm^3 22 1123   Wound Healing % 94 22 1123   Wound Assessment Pink/red 22 1123   Drainage Amount Small 22 1123   Drainage Description Serous 22 1123   Odor None 22 1123   Shanice-wound Assessment Dry/flaky;Edematous 22 1123   Wound Thickness Description not for Pressure Injury Full thickness 22 1123   Number of days: 24       Wound 22 Pretibial Left (Active)   Wound Image   07/25/22 1123   Wound Etiology Venous 07/25/22 1123   Dressing Status Old drainage noted 07/25/22 1123   Wound Cleansed Soap and water 07/25/22 1123   Wound Length (cm) 0.2 cm 07/25/22 1123   Wound Width (cm) 0.3 cm 07/25/22 1123   Wound Depth (cm) 0.1 cm 07/25/22 1123   Wound Surface Area (cm^2) 0.06 cm^2 07/25/22 1123   Change in Wound Size % (l*w) 99.97 07/25/22 1123   Wound Volume (cm^3) 0.006 cm^3 07/25/22 1123   Wound Healing % 100 07/25/22 1123   Wound Assessment Pink/red 07/25/22 1123   Drainage Amount Small 07/25/22 1123   Drainage Description Serous 07/25/22 1123   Odor None 07/25/22 1123   Shanice-wound Assessment Edematous;Dry/flaky 07/25/22 1123   Wound Thickness Description not for Pressure Injury Full thickness 07/25/22 1123   Number of days: 24          Wound care: Any procedure as below (debridement, skin substitute application, biopsy, total contact casting, chemical cauterization). Please see attached Discharge Instructions for specific wound treatment plan  Offloading: Avoid pressure and trauma to wound N/A no pressure relief needed for this patient  Edema control: multilayer compression   Infection: no signs of acute infection. Continue to monitor. Circulation: Available vascular imaging reviewed. N/A  Nutrition: stressed lean protein intake. Most recent pertinent imaging and labs reviewed: N/A  Case discussed with other providers involved in the patient's care: N/A      HISTORY of PRESENT ILLNESS HPI     Juarez Brooke is a 81 y.o. female who presents today for wound/ulcer evaluation. History of Wound Context: Per original history and physical on this patient. Changes in history since last exam and/or wound center visit: Patient has been receiving dressing changes with Xeroform gauze with a 2 layer compression wrap twice a week. On today's visit she is without complaints.   She denies having any pain or discomfort nor has she noted any redness or drainage. She is taking protein supplements. No changes in overall health since previous visit. Objective:    BP (!) 140/66   Pulse 70   Temp 98.1 °F (36.7 °C) (Oral)   Resp 18   Wt Readings from Last 3 Encounters:   07/11/22 271 lb 3.2 oz (123 kg)   07/01/22 275 lb (124.7 kg)   06/27/22 280 lb (127 kg)       PHYSICAL EXAM  General: alert and oriented to person, place and time, well-developed and well nourished, and in no acute distress. Skin: warm and dry, no rash. Head: normocephalic and atraumatic. Eyes: extraocular eye movements intact, conjunctivae normal, and sclera anicteric. ENT: hearing grossly normal bilaterally. Normal appearance. Respiratory: no chest wall tenderness. no respiratory distress. GI: abdomen soft, non-tender and non-distended, benign. Musculoskeletal: normal range of motion of joints. Nontender calves. No cyanosis. Edema trace. Neurologic: Speech normal. No focal deficits.  Mental status normal.     PAST MEDICAL HISTORY        Diagnosis Date    Arthritis of knee 1/11/2013    CHF (congestive heart failure) ([facility] [address] 1/11/2013    DM (diabetes mellitus) ([facility] [address] 1/11/2013    High risk medication use 1/11/2013    HTN (hypertension) 1/11/2013    Migraines 1/11/2013    Pulmonary HTN ([facility] [address] 1/11/2013    Venous incompetence 1/11/2013       PAST SURGICAL HISTORY    Past Surgical History:   Procedure Laterality Date    HERNIA REPAIR         FAMILY HISTORY    Family History   Problem Relation Age of Onset    Liver Disease Paternal Grandmother         cirrhosis - non drinker    Heart Disease Maternal Grandmother     Liver Disease Paternal Uncle         cirrhosis - nondrinker    Liver Disease Paternal Aunt         cirrhosis - nondrinker    Hypertension Mother     Parkinsonism Father     Heart Disease Mother     Heart Disease Paternal Grandfather     Lung Disease Father         copd       SOCIAL HISTORY    Social History     Tobacco Use    Smoking status: Never    Smokeless tobacco: Never Substance Use Topics    Alcohol use: No    Drug use: No       ALLERGIES    No Known Allergies    MEDICATIONS    Current Outpatient Medications on File Prior to Encounter   Medication Sig Dispense Refill    amLODIPine (NORVASC) 5 MG tablet Take 1 tablet by mouth daily 90 tablet 3    metoprolol succinate (TOPROL XL) 50 MG extended release tablet Take 1 tablet by mouth daily 90 tablet 3    furosemide (LASIX) 40 MG tablet TAKE 1 TABLET EVERY MORNING AND TAKE 1/2 TABLET EVERY EVENING      metFORMIN (GLUCOPHAGE) 500 MG tablet Take 1,000 mg by mouth 2 times daily (with meals)      potassium chloride (KLOR-CON) 10 MEQ extended release tablet TAKE 1 TABLET EVERY DAY      ramipril (ALTACE) 10 MG capsule TAKE 1 CAPSULE EVERY DAY       No current facility-administered medications on file prior to encounter. REVIEW OF SYSTEMS  A comprehensive review of systems was negative except for:  As in H I    Written patient dismissal instructions given to patient and signed by patient or POA. Thank you very much for the opportunity to participate in this patient's care. Electronically signed by Willy Dennison MD on 7/25/22 at 11:45 AM EDT     TIME:  If total time for today's E/M service is used for level of service it is documented below.     This time includes physician non-face-to-face service time visit on the date of service and includes    Preparing to see the patient (eg, review of tests)  Obtaining and/or reviewing separately obtained history  Performing a medically necessary appropriate examination and/or evaluation  Counseling and educating the patient/family/caregiver  Ordering medications, tests, or procedures  Referring and communicating with other health care professionals as needed  Documenting clinical information in the electronic or other health record  Independently interpreting results (not reported separately) and communicating results to the patient/family/caregiver  Care coordination (not reported separately)    E/M time = 20 Minutes

## 2022-07-25 NOTE — WOUND CARE
Discharge Instructions for  Gildardo Bowser  [address] [place] [address]  [address], [address]  Phone [phone]   Fax [phone]      NAME:  Brandon Rojo OF BIRTH:  1950  MEDICAL RECORD NUMBER:  [id number]  DATE:  7/25/2022    Return Appointment: 1 week on Tuesday   with Dr. Ximena Turner MD      Instructions: Instructions: Bilateral lower leg wounds:  Clean wounds and legs with soap and water. Xeroform- apply to wound bed. Cover with ABD pads pr super absorbent pads (I.e. exudry). 2 layer compression with wound and lower extremity assessment. If there is any problem with the dressing (too tight, slides down, etc.) Patient to return to wound clinic to have re-wrapped by clinician. *Gently compress and gradually increase due to patient's congestive heart failure. Change dressing 2 times weekly. [facility] for dressing change and wound assessment 2 TIMES/WEEK        Elevate legs when sitting. Avoid prolonged standing or sitting with legs in dependent position. Eliminate salt intake as this promotes fluid retention and swelling. Take diuretic (fluid pill) as instructed by your doctor. Continue taking protein shakes to promote wound healing. Wound healing is greatly slowed when glucose levels are greater than 200. Monitor glucose levels to ensure tight glucose control. Make an effort to sleep in your bed. A bed wedge may help with shortness of breath when lying down. Should you experience increased redness, swelling, pain, foul odor, size of wound(s), or have a temperature over 101 degrees please contact the [address] [place] [address] Road at [phone] or if after hours contact your primary care physician or go to the hospital emergency department. PLEASE NOTE: IF YOU ARE UNABLE TO OBTAIN WOUND SUPPLIES, CONTINUE TO USE THE SUPPLIES YOU HAVE AVAILABLE UNTIL YOU ARE ABLE TO REACH US.  IT IS MOST IMPORTANT TO KEEP THE WOUND COVERED AT ALL TIMES. Electronically signed Bibi Garnered on 7/25/2022 at 11:38 AM

## 2022-07-25 NOTE — FLOWSHEET NOTE
07/25/22 1123   Right Leg Edema Point of Measurement   Leg circumference 48 cm   Ankle circumference 23 cm   Foot circumference 25 cm   Compression Therapy 2 layer compression wrap   Left Leg Edema Point of Measurement   Leg circumference 46 cm   Ankle circumference 24.5 cm   Foot circumference 24.5 cm   Compression Therapy 2 layer compression wrap   RLE Neurovascular Assessment   Capillary Refill Less than/Equal to 3 seconds   Color Appropriate for Ethnicity   Temperature Warm   RLE Sensation  Full sensation   R Post Tibial Pulse Doppler   R Pedal Pulse Doppler   LLE Neurovascular Assessment   Capillary Refill Less than/Equal to 3 seconds   Color Appropriate for Ethnicity   Temperature Warm   LLE Sensation  Full sensation   L Post Tibial Pulse Doppler   L Pedal Pulse Doppler   Wound 07/01/22 Pretibial Right   Date First Assessed/Time First Assessed: 07/01/22 0945   Present on Hospital Admission: Yes  Primary Wound Type: Venous Ulcer  Location: Pretibial  Wound Location Orientation: Right   Wound Image    Wound Etiology Venous   Dressing Status Old drainage noted   Wound Cleansed Soap and water   Wound Length (cm) 2 cm   Wound Width (cm) 5 cm   Wound Depth (cm) 0.1 cm   Wound Surface Area (cm^2) 10 cm^2   Change in Wound Size % (l*w) 93.65   Wound Volume (cm^3) 1 cm^3   Wound Healing % 94   Wound Assessment Pink/red   Drainage Amount Small   Drainage Description Serous   Odor None   Shanice-wound Assessment Dry/flaky;Edematous   Wound Thickness Description not for Pressure Injury Full thickness   Wound 07/01/22 Pretibial Left   Date First Assessed/Time First Assessed: 07/01/22 0949   Present on Hospital Admission: Yes  Primary Wound Type: Venous Ulcer  Location: Pretibial  Wound Location Orientation: Left   Wound Image    Wound Etiology Venous   Dressing Status Old drainage noted   Wound Cleansed Soap and water   Wound Length (cm) 0.2 cm   Wound Width (cm) 0.3 cm   Wound Depth (cm) 0.1 cm   Wound Surface Area (cm^2) 0.06 cm^2   Change in Wound Size % (l*w) 99.97   Wound Volume (cm^3) 0.006 cm^3   Wound Healing % 100   Wound Assessment Pink/red   Drainage Amount Small   Drainage Description Serous   Odor None   Shanice-wound Assessment Edematous;Dry/flaky   Wound Thickness Description not for Pressure Injury Full thickness   Pain Assessment   Pain Assessment 0-10   Pain Level 0   Pain Location Leg

## 2022-08-02 ENCOUNTER — HOSPITAL ENCOUNTER (OUTPATIENT)
Dept: WOUND CARE | Age: 72
Discharge: HOME | End: 2022-08-02
Payer: MEDICARE

## 2022-08-02 VITALS
HEART RATE: 78 BPM | WEIGHT: 269 LBS | SYSTOLIC BLOOD PRESSURE: 132 MMHG | TEMPERATURE: 98.6 F | BODY MASS INDEX: 49.5 KG/M2 | HEIGHT: 62 IN | DIASTOLIC BLOOD PRESSURE: 69 MMHG

## 2022-08-02 DIAGNOSIS — L97.929 CHRONIC VENOUS HYPERTENSION WITH ULCER AND INFLAMMATION INVOLVING BOTH SIDES (HCC): Primary | ICD-10-CM

## 2022-08-02 DIAGNOSIS — I87.333 CHRONIC VENOUS HYPERTENSION WITH ULCER AND INFLAMMATION INVOLVING BOTH SIDES (HCC): Primary | ICD-10-CM

## 2022-08-02 DIAGNOSIS — L97.919 CHRONIC VENOUS HYPERTENSION WITH ULCER AND INFLAMMATION INVOLVING BOTH SIDES (HCC): Primary | ICD-10-CM

## 2022-08-02 PROCEDURE — 29581 APPL MULTLAYER CMPRN SYS LEG: CPT

## 2022-08-02 PROCEDURE — 99213 OFFICE O/P EST LOW 20 MIN: CPT | Performed by: SURGERY

## 2022-08-02 RX ORDER — LIDOCAINE HYDROCHLORIDE 20 MG/ML
JELLY TOPICAL ONCE
Status: CANCELLED | OUTPATIENT
Start: 2022-08-02 | End: 2022-08-02

## 2022-08-02 RX ORDER — LIDOCAINE 40 MG/G
CREAM TOPICAL ONCE
Status: CANCELLED | OUTPATIENT
Start: 2022-08-02 | End: 2022-08-02

## 2022-08-02 RX ORDER — CLOBETASOL PROPIONATE 0.5 MG/G
OINTMENT TOPICAL ONCE
Status: CANCELLED | OUTPATIENT
Start: 2022-08-02 | End: 2022-08-02

## 2022-08-02 RX ORDER — LIDOCAINE HYDROCHLORIDE 40 MG/ML
SOLUTION TOPICAL ONCE
Status: CANCELLED | OUTPATIENT
Start: 2022-08-02 | End: 2022-08-02

## 2022-08-02 RX ORDER — BETAMETHASONE DIPROPIONATE 0.05 %
OINTMENT (GRAM) TOPICAL ONCE
Status: CANCELLED | OUTPATIENT
Start: 2022-08-02 | End: 2022-08-02

## 2022-08-02 RX ORDER — LIDOCAINE 50 MG/G
OINTMENT TOPICAL ONCE
Status: CANCELLED | OUTPATIENT
Start: 2022-08-02 | End: 2022-08-02

## 2022-08-02 RX ORDER — GINSENG 100 MG
CAPSULE ORAL ONCE
Status: CANCELLED | OUTPATIENT
Start: 2022-08-02 | End: 2022-08-02

## 2022-08-02 RX ORDER — BACITRACIN, NEOMYCIN, POLYMYXIN B 400; 3.5; 5 [USP'U]/G; MG/G; [USP'U]/G
OINTMENT TOPICAL ONCE
Status: CANCELLED | OUTPATIENT
Start: 2022-08-02 | End: 2022-08-02

## 2022-08-02 RX ORDER — GENTAMICIN SULFATE 1 MG/G
OINTMENT TOPICAL ONCE
Status: CANCELLED | OUTPATIENT
Start: 2022-08-02 | End: 2022-08-02

## 2022-08-02 RX ORDER — BACITRACIN ZINC AND POLYMYXIN B SULFATE 500; 1000 [USP'U]/G; [USP'U]/G
OINTMENT TOPICAL ONCE
Status: CANCELLED | OUTPATIENT
Start: 2022-08-02 | End: 2022-08-02

## 2022-08-02 NOTE — FLOWSHEET NOTE
08/02/22 1040   Right Leg Edema Point of Measurement   Leg circumference 48 cm   Ankle circumference 23 cm   Foot circumference 25 cm   Compression Therapy 2 layer compression wrap   Left Leg Edema Point of Measurement   Leg circumference 48 cm   Ankle circumference 23 cm   Foot circumference 26 cm   Compression Therapy 2 layer compression wrap   RLE Neurovascular Assessment   Capillary Refill Less than/Equal to 3 seconds   Color Pink   Temperature Warm   RLE Sensation  Full sensation   R Pedal Pulse Doppler   LLE Neurovascular Assessment   Capillary Refill Less than/Equal to 3 seconds   Color Pink   Temperature Warm   LLE Sensation  Full sensation   L Pedal Pulse Doppler   Wound 07/01/22 Pretibial Right   Date First Assessed/Time First Assessed: 07/01/22 0945   Present on Hospital Admission: Yes  Primary Wound Type: Venous Ulcer  Location: Pretibial  Wound Location Orientation: Right   Wound Image    Wound Etiology Venous   Dressing Status Clean;Dry; Intact   Wound Length (cm) 0 cm   Wound Width (cm) 0 cm   Wound Depth (cm) 0 cm   Wound Surface Area (cm^2) 0 cm^2   Change in Wound Size % (l*w) 100   Wound Volume (cm^3) 0 cm^3   Wound Healing % 100   Wound Assessment Pink/red   Drainage Amount None   Odor None   Shanice-wound Assessment Fragile; Intact   Wound 07/01/22 Pretibial Left   Date First Assessed/Time First Assessed: 07/01/22 0949   Present on Hospital Admission: Yes  Primary Wound Type: Venous Ulcer  Location: Pretibial  Wound Location Orientation: Left   Wound Image    Wound Etiology Venous   Dressing Status Clean;Dry; Intact   Wound Cleansed Cleansed with saline; Soap and water   Wound Length (cm) 5 cm   Wound Width (cm) 0.5 cm   Wound Depth (cm) 0.1 cm   Wound Surface Area (cm^2) 2.5 cm^2   Change in Wound Size % (l*w) 98.86   Wound Volume (cm^3) 0.25 cm^3   Wound Healing % 99   Wound Assessment Pink/red   Drainage Amount Scant   Drainage Description Serous   Odor None   Shanice-wound Assessment Edematous; Blanchable erythema;Fragile   Margins Attached edges   Wound Thickness Description not for Pressure Injury Full thickness   Pain Assessment   Pain Assessment None - Denies Pain   Wound mechanically debrided using gauze & saline

## 2022-08-02 NOTE — PROGRESS NOTES
[address]   Progress Note     Armaan Claros  MEDICAL RECORD NUMBER:  [id number]  AGE: 81 y.o. GENDER: female  : 1950  EPISODE DATE:  2022    Subjective:     Chief Complaint   Patient presents with    Wound Check     Bilateral lower legs        Patient without any new wound care issues. Assessment:     Chronic venous hypertension with ulcer and inflammation involving both lower extremities, right side healed    Problem List Items Addressed This Visit          Circulatory    Chronic venous hypertension with ulcer and inflammation involving both sides ([address] - Primary       Wound evaluation: Ulcer on right has healed, left side appears unchanged, skin slightly reddened, no infection. Patient has moderate risk for morbidity and mortality due to conditions affecting wound healing discussed or addressed today: Chronic venous hypertension  Education and discussion held with patient regarding these disease processes especially issues related to the wound(s). Plan:   Plan to continue dressing changes with zinc patch and 2 layer compression wrap, order Velcro wraps, continue protein supplementation, follow-up in 2 weeks for recheck. We will discontinue home health nursing and have the patient come to wound center for dressing changes. Wound 22 Pretibial Right (Active)   Wound Image   22 1040   Wound Etiology Venous 22 1040   Dressing Status Clean;Dry; Intact 22 1040   Wound Cleansed Soap and water 22 1123   Wound Length (cm) 0 cm 22 1040   Wound Width (cm) 0 cm 22 1040   Wound Depth (cm) 0 cm 22 1040   Wound Surface Area (cm^2) 0 cm^2 22 1040   Change in Wound Size % (l*w) 100 22 1040   Wound Volume (cm^3) 0 cm^3 22 1040   Wound Healing % 100 22 1040   Wound Assessment Pink/red 22 1040   Drainage Amount None 22 1040   Drainage Description Serous 22 1123   Odor None 22 1040 Shanice-wound Assessment Fragile; Intact 08/02/22 1040   Wound Thickness Description not for Pressure Injury Full thickness 07/25/22 1123   Number of days: 32       Wound 07/01/22 Pretibial Left (Active)   Wound Image   08/02/22 1040   Wound Etiology Venous 08/02/22 1040   Dressing Status Clean;Dry; Intact 08/02/22 1040   Wound Cleansed Cleansed with saline; Soap and water 08/02/22 1040   Wound Length (cm) 5 cm 08/02/22 1040   Wound Width (cm) 0.5 cm 08/02/22 1040   Wound Depth (cm) 0.1 cm 08/02/22 1040   Wound Surface Area (cm^2) 2.5 cm^2 08/02/22 1040   Change in Wound Size % (l*w) 98.86 08/02/22 1040   Wound Volume (cm^3) 0.25 cm^3 08/02/22 1040   Wound Healing % 99 08/02/22 1040   Wound Assessment Pink/red 08/02/22 1040   Drainage Amount Scant 08/02/22 1040   Drainage Description Serous 08/02/22 1040   Odor None 08/02/22 1040   Shanice-wound Assessment Edematous; Blanchable erythema;Fragile 08/02/22 1040   Margins Attached edges 08/02/22 1040   Wound Thickness Description not for Pressure Injury Full thickness 08/02/22 1040   Number of days: 32          Wound care: Any procedure as below (debridement, skin substitute application, biopsy, total contact casting, chemical cauterization). Please see attached Discharge Instructions for specific wound treatment plan  Offloading: Avoid pressure and trauma to wound N/A no pressure relief needed for this patient  Edema control: multilayer compression   Infection: no signs of acute infection. Continue to monitor. Circulation: Available vascular imaging reviewed. N/A  Nutrition: stressed lean protein intake. Most recent pertinent imaging and labs reviewed: N/A  Case discussed with other providers involved in the patient's care: N/A      HISTORY of PRESENT ILLNESS HPI     Tessa Liao is a 81 y.o. female who presents today for wound/ulcer evaluation. History of Wound Context: Per original history and physical on this patient.  Changes in history since last exam and/or wound communicating with other health care professionals as needed  Documenting clinical information in the electronic or other health record  Independently interpreting results (not reported separately) and communicating results to the patient/family/caregiver  Care coordination (not reported separately)    E/M time = 20 Minutes

## 2022-08-02 NOTE — WOUND CARE
Discharge Instructions for  Gildardo Bowser  [address] [facility] [address]  [address], [address]  Phone [phone]   Fax [phone]      NAME:  Wilber Napoles OF BIRTH:  1950  MEDICAL RECORD NUMBER:  [id number]  DATE:  @ED@    Return Appointment:   2 weeks with Dr. Sanket Brown MD  Twice per week for clinician visit to have dressing changed    Instructions: Bilateral lower legs  Cleanse legs with soap & water, wound with normal saline. Use zinc patch on front of both legs where red area is located. Use 2-layer compression, elevating legs throughout the day to manage edema and dressing tightness. 2 layer compression with wound and lower extremity assessment. If there is any problem with the dressing (too tight, slides down, etc.) Patient to return to wound clinic to have re-wrapped by clinician. Change dressing 2 times weekly. [address] Velcro wraps measured for & ordered this visit. Elevate legs when sitting. Avoid prolonged standing or sitting with legs in dependent position. Eliminate salt intake as this promotes fluid retention and swelling. Take diuretic (fluid pill) as instructed by your doctor. Continue taking protein shakes to promote wound healing. Wound healing is greatly slowed when glucose levels are greater than 200. Monitor glucose levels to ensure tight glucose control. Make an effort to sleep in your bed. A bed wedge may help with shortness of breath when lying down. Should you experience increased redness, swelling, pain, foul odor, size of wound(s), or have a temperature over 101 degrees please contact the [address] [place] [address] Road at [phone] or if after hours contact your primary care physician or go to the hospital emergency department. PLEASE NOTE: IF YOU ARE UNABLE TO OBTAIN WOUND SUPPLIES, CONTINUE TO USE THE SUPPLIES YOU HAVE AVAILABLE UNTIL YOU ARE ABLE TO REACH US.  IT IS MOST IMPORTANT TO KEEP THE WOUND COVERED AT ALL TIMES.     Electronically signed Duarte Herrera RN on 8/2/2022 at 10:56 AM

## 2022-08-02 NOTE — DISCHARGE INSTRUCTIONS
Discharge Instructions for  Gildardo Bowser  [address] [facility] [address]  [facility], [address]  Phone [phone]   Fax [phone]      NAME:  Shruti Bhatt OF BIRTH:  1950  MEDICAL RECORD NUMBER:  [id number]  DATE:  @ED@    Return Appointment:   2 weeks with Dr. Romana Lucky, MD  Twice per week for clinician visit to have dressing changed    Instructions: Bilateral lower legs  Cleanse legs with soap & water, wound with normal saline. Use zinc patch on front of both legs where red area is located. Use 2-layer compression, elevating legs throughout the day to manage edema and dressing tightness. 2 layer compression with wound and lower extremity assessment. If there is any problem with the dressing (too tight, slides down, etc.) Patient to return to wound clinic to have re-wrapped by clinician. Change dressing 2 times weekly. [address]. Velcro wraps measured for & ordered this visit. Elevate legs when sitting. Avoid prolonged standing or sitting with legs in dependent position. Eliminate salt intake as this promotes fluid retention and swelling. Take diuretic (fluid pill) as instructed by your doctor. Continue taking protein shakes to promote wound healing. Wound healing is greatly slowed when glucose levels are greater than 200. Monitor glucose levels to ensure tight glucose control. Make an effort to sleep in your bed. A bed wedge may help with shortness of breath when lying down. Should you experience increased redness, swelling, pain, foul odor, size of wound(s), or have a temperature over 101 degrees please contact the [address] [place] [place] [address] Road at [phone] or if after hours contact your primary care physician or go to the hospital emergency department. PLEASE NOTE: IF YOU ARE UNABLE TO OBTAIN WOUND SUPPLIES, CONTINUE TO USE THE SUPPLIES YOU HAVE AVAILABLE UNTIL YOU ARE ABLE TO REACH US.  IT IS MOST IMPORTANT TO KEEP THE WOUND COVERED AT ALL TIMES.     Electronically signed Ora Faust RN on 8/2/2022 at 10:56 AM

## 2022-08-02 NOTE — WOUND CARE
Multilayer Compression Wrap   (Not Unna) Below the Knee    NAME:  Mckenna Colvin  YOB: 1950  MEDICAL RECORD NUMBER:  [id number]  DATE:  8/2/2022    Multilayer compression wrap: Removed old Multilayer wrap if indicated and wash leg with mild soap/water. Applied moisturizing agent to dry skin as needed. Applied primary and secondary dressing as ordered. Applied multilayered dressing below the knee to right lower leg. Applied multilayered dressing below the knee to left lower leg. Instructed patient/caregiver not to remove dressing and to keep it clean and dry. Instructed patient/caregiver on complications to report to provider, such as pain, numbness in toes, heavy drainage, and slippage of dressing. Instructed patient on purpose of compression dressing and on activity and exercise recommendations.       Electronically signed by Abby Ross RN on 8/2/2022 at 11:37 AM

## 2022-08-05 ENCOUNTER — HOSPITAL ENCOUNTER (OUTPATIENT)
Dept: WOUND CARE | Age: 72
Setting detail: RECURRING SERIES
Discharge: HOME | End: 2022-08-05
Payer: MEDICARE

## 2022-08-05 VITALS
RESPIRATION RATE: 18 BRPM | DIASTOLIC BLOOD PRESSURE: 70 MMHG | HEART RATE: 73 BPM | OXYGEN SATURATION: 96 % | TEMPERATURE: 98.2 F | SYSTOLIC BLOOD PRESSURE: 169 MMHG

## 2022-08-05 DIAGNOSIS — L97.919 CHRONIC VENOUS HYPERTENSION WITH ULCER AND INFLAMMATION INVOLVING BOTH SIDES (HCC): Primary | ICD-10-CM

## 2022-08-05 DIAGNOSIS — L97.929 CHRONIC VENOUS HYPERTENSION WITH ULCER AND INFLAMMATION INVOLVING BOTH SIDES (HCC): Primary | ICD-10-CM

## 2022-08-05 DIAGNOSIS — I87.333 CHRONIC VENOUS HYPERTENSION WITH ULCER AND INFLAMMATION INVOLVING BOTH SIDES (HCC): Primary | ICD-10-CM

## 2022-08-05 PROCEDURE — 29581 APPL MULTLAYER CMPRN SYS LEG: CPT

## 2022-08-05 RX ORDER — LIDOCAINE 50 MG/G
OINTMENT TOPICAL ONCE
Status: CANCELLED | OUTPATIENT
Start: 2022-08-05 | End: 2022-08-05

## 2022-08-05 RX ORDER — LIDOCAINE HYDROCHLORIDE 20 MG/ML
JELLY TOPICAL ONCE
Status: CANCELLED | OUTPATIENT
Start: 2022-08-05 | End: 2022-08-05

## 2022-08-05 RX ORDER — GINSENG 100 MG
CAPSULE ORAL ONCE
Status: CANCELLED | OUTPATIENT
Start: 2022-08-05 | End: 2022-08-05

## 2022-08-05 RX ORDER — GENTAMICIN SULFATE 1 MG/G
OINTMENT TOPICAL ONCE
Status: CANCELLED | OUTPATIENT
Start: 2022-08-05 | End: 2022-08-05

## 2022-08-05 RX ORDER — BACITRACIN, NEOMYCIN, POLYMYXIN B 400; 3.5; 5 [USP'U]/G; MG/G; [USP'U]/G
OINTMENT TOPICAL ONCE
Status: CANCELLED | OUTPATIENT
Start: 2022-08-05 | End: 2022-08-05

## 2022-08-05 RX ORDER — BACITRACIN ZINC AND POLYMYXIN B SULFATE 500; 1000 [USP'U]/G; [USP'U]/G
OINTMENT TOPICAL ONCE
Status: CANCELLED | OUTPATIENT
Start: 2022-08-05 | End: 2022-08-05

## 2022-08-05 RX ORDER — CLOBETASOL PROPIONATE 0.5 MG/G
OINTMENT TOPICAL ONCE
Status: CANCELLED | OUTPATIENT
Start: 2022-08-05 | End: 2022-08-05

## 2022-08-05 RX ORDER — BETAMETHASONE DIPROPIONATE 0.05 %
OINTMENT (GRAM) TOPICAL ONCE
Status: CANCELLED | OUTPATIENT
Start: 2022-08-05 | End: 2022-08-05

## 2022-08-05 RX ORDER — LIDOCAINE HYDROCHLORIDE 40 MG/ML
SOLUTION TOPICAL ONCE
Status: CANCELLED | OUTPATIENT
Start: 2022-08-05 | End: 2022-08-05

## 2022-08-05 RX ORDER — LIDOCAINE 40 MG/G
CREAM TOPICAL ONCE
Status: CANCELLED | OUTPATIENT
Start: 2022-08-05 | End: 2022-08-05

## 2022-08-05 NOTE — WOUND CARE
Multilayer Compression Wrap   (Not Unna) Below the Knee    NAME:  Johnson Severino  YOB: 1950  MEDICAL RECORD NUMBER:  [id number]  DATE:  8/5/2022    Multilayer compression wrap: Removed old Multilayer wrap if indicated and wash leg with mild soap/water. Applied primary and secondary dressing as ordered. Applied multilayered dressing below the knee to left lower leg. Instructed patient/caregiver not to remove dressing and to keep it clean and dry. Instructed patient/caregiver on complications to report to provider, such as pain, numbness in toes, heavy drainage, and slippage of dressing. Instructed patient on purpose of compression dressing and on activity and exercise recommendations.       Electronically signed by Marry Vogel PT, [address] [facility] [facility] on 8/5/2022 at 10:26 AM

## 2022-08-05 NOTE — FLOWSHEET NOTE
08/05/22 0841   Right Leg Edema Point of Measurement   Compression Therapy Velcro compression wrap   Left Leg Edema Point of Measurement   Leg circumference 40 cm   Ankle circumference 23 cm   Foot circumference 25 cm   Compression Therapy 2 layer compression wrap   Wound 07/01/22 Pretibial Right   Date First Assessed/Time First Assessed: 07/01/22 0945   Present on Hospital Admission: Yes  Primary Wound Type: Venous Ulcer  Location: Pretibial  Wound Location Orientation: Right   Wound Etiology Venous   Dressing Status Clean;Dry; Intact   Wound Cleansed Soap and water   Dressing/Treatment   (Zinc Patch, Coflex TLC)   Wound Length (cm) 0 cm   Wound Width (cm) 0 cm   Wound Depth (cm) 0 cm   Wound Surface Area (cm^2) 0 cm^2   Change in Wound Size % (l*w) 100   Wound Volume (cm^3) 0 cm^3   Wound Healing % 100   Wound Assessment Epithelialization   Drainage Amount None   Odor None   Wound Thickness Description not for Pressure Injury Full thickness   Wound 07/01/22 Pretibial Left   Date First Assessed/Time First Assessed: 07/01/22 0949   Present on Hospital Admission: Yes  Primary Wound Type: Venous Ulcer  Location: Pretibial  Wound Location Orientation: Left   Wound Image    Wound Etiology Venous   Dressing Status Old drainage noted   Wound Cleansed Soap and water   Dressing/Treatment   (Zinc patch, Coflex TLC)   Wound Length (cm) 0.2 cm   Wound Width (cm) 0.2 cm   Wound Depth (cm) 0.1 cm   Wound Surface Area (cm^2) 0.04 cm^2   Change in Wound Size % (l*w) 99.98   Wound Volume (cm^3) 0.004 cm^3   Wound Healing % 100   Wound Assessment Pink/red   Drainage Amount Scant   Drainage Description Serous   Odor None   Shanice-wound Assessment Edematous; Blanchable erythema   Wound Thickness Description not for Pressure Injury Full thickness   Pain Assessment   Pain Assessment None - Denies Pain   Patient brought Velcro wrap that was ordered for patient. Velcro wrap applied to right lower leg due to wound being healed.

## 2022-08-09 ENCOUNTER — HOSPITAL ENCOUNTER (OUTPATIENT)
Dept: WOUND CARE | Age: 72
Setting detail: RECURRING SERIES
Discharge: HOME | End: 2022-08-09
Payer: MEDICARE

## 2022-08-09 VITALS
OXYGEN SATURATION: 98 % | TEMPERATURE: 98.5 F | DIASTOLIC BLOOD PRESSURE: 69 MMHG | RESPIRATION RATE: 18 BRPM | SYSTOLIC BLOOD PRESSURE: 153 MMHG | HEART RATE: 89 BPM

## 2022-08-09 DIAGNOSIS — I87.333 CHRONIC VENOUS HYPERTENSION (IDIOPATHIC) WITH ULCER AND INFLAMMATION OF BILATERAL LOWER EXTREMITY (CODE) (HCC): Primary | ICD-10-CM

## 2022-08-09 PROCEDURE — 99212 OFFICE O/P EST SF 10 MIN: CPT

## 2022-08-09 PROCEDURE — 99212 OFFICE O/P EST SF 10 MIN: CPT | Performed by: SURGERY

## 2022-08-09 RX ORDER — LIDOCAINE 40 MG/G
CREAM TOPICAL ONCE
Status: CANCELLED | OUTPATIENT
Start: 2022-08-09 | End: 2022-08-09

## 2022-08-09 RX ORDER — CLOBETASOL PROPIONATE 0.5 MG/G
OINTMENT TOPICAL ONCE
Status: CANCELLED | OUTPATIENT
Start: 2022-08-09 | End: 2022-08-09

## 2022-08-09 RX ORDER — BACITRACIN, NEOMYCIN, POLYMYXIN B 400; 3.5; 5 [USP'U]/G; MG/G; [USP'U]/G
OINTMENT TOPICAL ONCE
Status: CANCELLED | OUTPATIENT
Start: 2022-08-09 | End: 2022-08-09

## 2022-08-09 RX ORDER — LIDOCAINE HYDROCHLORIDE 40 MG/ML
SOLUTION TOPICAL ONCE
Status: CANCELLED | OUTPATIENT
Start: 2022-08-09 | End: 2022-08-09

## 2022-08-09 RX ORDER — BACITRACIN ZINC AND POLYMYXIN B SULFATE 500; 1000 [USP'U]/G; [USP'U]/G
OINTMENT TOPICAL ONCE
Status: CANCELLED | OUTPATIENT
Start: 2022-08-09 | End: 2022-08-09

## 2022-08-09 RX ORDER — LIDOCAINE HYDROCHLORIDE 20 MG/ML
JELLY TOPICAL ONCE
Status: CANCELLED | OUTPATIENT
Start: 2022-08-09 | End: 2022-08-09

## 2022-08-09 RX ORDER — GENTAMICIN SULFATE 1 MG/G
OINTMENT TOPICAL ONCE
Status: CANCELLED | OUTPATIENT
Start: 2022-08-09 | End: 2022-08-09

## 2022-08-09 RX ORDER — LIDOCAINE 50 MG/G
OINTMENT TOPICAL ONCE
Status: CANCELLED | OUTPATIENT
Start: 2022-08-09 | End: 2022-08-09

## 2022-08-09 RX ORDER — BETAMETHASONE DIPROPIONATE 0.05 %
OINTMENT (GRAM) TOPICAL ONCE
Status: CANCELLED | OUTPATIENT
Start: 2022-08-09 | End: 2022-08-09

## 2022-08-09 RX ORDER — GINSENG 100 MG
CAPSULE ORAL ONCE
Status: CANCELLED | OUTPATIENT
Start: 2022-08-09 | End: 2022-08-09

## 2022-08-09 NOTE — FLOWSHEET NOTE
08/09/22 1150   Right Leg Edema Point of Measurement   Leg circumference 45 cm   Ankle circumference 23 cm   Foot circumference 23.5 cm   Compression Therapy Velcro compression wrap   Left Leg Edema Point of Measurement   Leg circumference 43 cm   Ankle circumference 25.5 cm   Foot circumference 24 cm   Compression Therapy Velcro compression wrap   Wound 07/01/22 Pretibial Right   Date First Assessed/Time First Assessed: 07/01/22 0945   Present on Hospital Admission: Yes  Primary Wound Type: Venous Ulcer  Location: Pretibial  Wound Location Orientation: Right   Wound Image    Wound Etiology Venous   Dressing Status Clean;Dry; Intact   Wound Cleansed Soap and water   Dressing/Treatment   (Velcro Compression Wrap)   Wound Length (cm) 0 cm   Wound Width (cm) 0 cm   Wound Depth (cm) 0 cm   Wound Surface Area (cm^2) 0 cm^2   Change in Wound Size % (l*w) 100   Wound Volume (cm^3) 0 cm^3   Wound Healing % 100   Wound Assessment Epithelialization   Drainage Amount None   Odor None   Wound 07/01/22 Pretibial Left   Date First Assessed/Time First Assessed: 07/01/22 0949   Present on Hospital Admission: Yes  Primary Wound Type: Venous Ulcer  Location: Pretibial  Wound Location Orientation: Left   Wound Image    Wound Etiology Venous   Dressing Status Clean;Dry; Intact   Wound Cleansed Soap and water   Dressing/Treatment   (Zinc patch, ABD, Coflex TLC)   Wound Length (cm) 0 cm   Wound Width (cm) 0 cm   Wound Depth (cm) 0 cm   Wound Surface Area (cm^2) 0 cm^2   Change in Wound Size % (l*w) 100   Wound Volume (cm^3) 0 cm^3   Wound Healing % 100   Wound Assessment Epithelialization   Drainage Amount None   Odor None   Shanice-wound Assessment Intact   Pain Assessment   Pain Assessment None - Denies Pain   Bilateral Velcro wraps were applied.  was instructed how to mohinder/doff garments.  was able to demonstrate independence with donning garment without questions.

## 2022-08-09 NOTE — DISCHARGE INSTRUCTIONS
Discharge Instructions for  Gildardo Bowser  [address]  [address], [address]  Phone [phone]  Fax [phone]        NAME:  Cecelia Bhatt OF BIRTH:  1950  MEDICAL RECORD NUMBER:  [id number]  DATE:  8/9/2022     Return Appointment:   Discharge from wound center at this time. Instructions: Bilateral lower legs  Wounds are healed! Wear compression wraps to bilateral lower legs daily; place on first in morning and may remove at night for bathing and sleeping. Patient may shower as previously without wound dressings. Apply moisturizer 1-2 times daily. Elevate legs when sitting. Avoid prolonged standing or sitting with legs in dependent position. Eliminate salt intake as this promotes fluid retention and swelling. Should you experience increased redness, swelling, pain, foul odor, size of wound(s), or have a temperature over 101 degrees please contact the [address] [place] [address] Road at [phone] or if after hours contact your primary care physician or go to the hospital emergency department. PLEASE NOTE: IF YOU ARE UNABLE TO OBTAIN WOUND SUPPLIES, CONTINUE TO USE THE SUPPLIES YOU HAVE AVAILABLE UNTIL YOU ARE ABLE TO REACH US. IT IS MOST IMPORTANT TO KEEP THE WOUND COVERED AT ALL TIMES.      Electronically signed Sera Schwartz PT, [facility] on 8/9/2022 at 11:42 AM

## 2022-08-09 NOTE — PROGRESS NOTES
[address]   Progress Note     [facility]  MEDICAL RECORD NUMBER:  [id number]  AGE: 81 y.o. GENDER: female  : 1950  EPISODE DATE:  2022    Subjective:     No chief complaint on file. Has been tolerating wound care dressings without problems. Assessment:     Chronic venous hypertension with ulcer and inflammation involving both lower extremities, healed    Problem List Items Addressed This Visit    None      Wound evaluation: Venous ulcers both lower extremities have healed, no redness or drainage noted. Patient has moderate risk for morbidity and mortality due to conditions affecting wound healing discussed or addressed today: Chronic venous hypertension  Education and discussion held with patient regarding these disease processes especially issues related to the wound(s). Plan:   Plan to discontinue dressing changes, use Velcro wraps to control swelling, follow-up at wound center as needed. Wound 22 Pretibial Right (Active)   Wound Image   22 1040   Wound Etiology Venous 22 0841   Dressing Status Clean;Dry; Intact 22 0841   Wound Cleansed Soap and water 22 0841   Wound Length (cm) 0 cm 22 0841   Wound Width (cm) 0 cm 22 0841   Wound Depth (cm) 0 cm 22 0841   Wound Surface Area (cm^2) 0 cm^2 22 0841   Change in Wound Size % (l*w) 100 22 0841   Wound Volume (cm^3) 0 cm^3 22 0841   Wound Healing % 100 22 0841   Wound Assessment Epithelialization 22 0841   Drainage Amount None 22 0841   Drainage Description Serous 22 1123   Odor None 22 0841   Shanice-wound Assessment Fragile; Intact 22 1040   Wound Thickness Description not for Pressure Injury Full thickness 22 0841   Number of days: 39       Wound 22 Pretibial Left (Active)   Wound Image   22 0841   Wound Etiology Venous 22 0841   Dressing Status Old drainage noted 22 0841 this visit. Wt Readings from Last 3 Encounters:   08/02/22 269 lb (122 kg)   07/11/22 271 lb 3.2 oz (123 kg)   07/01/22 275 lb (124.7 kg)       PHYSICAL EXAM  General: alert and oriented to person, place and time, well-developed and well nourished, and in no acute distress. Skin: warm and dry, no rash. Head: normocephalic and atraumatic. Eyes: extraocular eye movements intact, conjunctivae normal, and sclera anicteric. ENT: hearing grossly normal bilaterally. Normal appearance. Respiratory: no chest wall tenderness. no respiratory distress. GI: abdomen soft, non-tender and non-distended, benign. Musculoskeletal: normal range of motion of joints. Nontender calves. No cyanosis. Edema trace. Neurologic: Speech normal. No focal deficits.  Mental status normal.     PAST MEDICAL HISTORY        Diagnosis Date    Arthritis of knee 1/11/2013    CHF (congestive heart failure) ([facility] 1/11/2013    DM (diabetes mellitus) ([facility] 1/11/2013    High risk medication use 1/11/2013    HTN (hypertension) 1/11/2013    Migraines 1/11/2013    [facility] 1/11/2013    Venous incompetence 1/11/2013       PAST SURGICAL HISTORY    Past Surgical History:   Procedure Laterality Date    HERNIA REPAIR         FAMILY HISTORY    Family History   Problem Relation Age of Onset    Liver Disease Paternal Grandmother         cirrhosis - non drinker    Heart Disease Maternal Grandmother     Liver Disease Paternal Uncle         cirrhosis - nondrinker    Liver Disease Paternal Aunt         cirrhosis - nondrinker    Hypertension Mother     Parkinsonism Father     Heart Disease Mother     Heart Disease Paternal Grandfather     Lung Disease Father         copd       SOCIAL HISTORY    Social History     Tobacco Use    Smoking status: Never    Smokeless tobacco: Never   Substance Use Topics    Alcohol use: No    Drug use: No       ALLERGIES    No Known Allergies    MEDICATIONS    Current Outpatient Medications on File Prior to Encounter Medication Sig Dispense Refill    amLODIPine (NORVASC) 5 MG tablet Take 1 tablet by mouth daily 90 tablet 3    metoprolol succinate (TOPROL XL) 50 MG extended release tablet Take 1 tablet by mouth daily 90 tablet 3    furosemide (LASIX) 40 MG tablet TAKE 1 TABLET EVERY MORNING AND TAKE 1/2 TABLET EVERY EVENING      metFORMIN (GLUCOPHAGE) 500 MG tablet Take 1,000 mg by mouth 2 times daily (with meals)      potassium chloride (KLOR-CON) 10 MEQ extended release tablet TAKE 1 TABLET EVERY DAY      ramipril (ALTACE) 10 MG capsule TAKE 1 CAPSULE EVERY DAY       No current facility-administered medications on file prior to encounter. REVIEW OF SYSTEMS  A comprehensive review of systems was negative except for:  As in HPI. Written patient dismissal instructions given to patient and signed by patient or POA. Thank you very much for the opportunity to participate in this patient's care. Electronically signed by Surendra Arreguin MD on 8/9/22 at 11:29 AM EDT     TIME:  If total time for today's E/M service is used for level of service it is documented below.     This time includes physician non-face-to-face service time visit on the date of service and includes    Preparing to see the patient (eg, review of tests)  Obtaining and/or reviewing separately obtained history  Performing a medically necessary appropriate examination and/or evaluation  Counseling and educating the patient/family/caregiver  Ordering medications, tests, or procedures  Referring and communicating with other health care professionals as needed  Documenting clinical information in the electronic or other health record  Independently interpreting results (not reported separately) and communicating results to the patient/family/caregiver  Care coordination (not reported separately)    E/M time = 15 Minutes

## 2022-12-10 ASSESSMENT — ENCOUNTER SYMPTOMS
PHOTOPHOBIA: 0
ABDOMINAL DISTENTION: 0
ABDOMINAL PAIN: 0
SHORTNESS OF BREATH: 0
DIARRHEA: 0
SORE THROAT: 0
COUGH: 0
CONSTIPATION: 0

## 2022-12-10 NOTE — PROGRESS NOTES
[facility] CARDIOLOGY  [address], [address]  PHONE: [phone]        NAME:  Anne Petersen  : 1950  MRN: [id number]     PCP:  Mike Johnson MD      SUBJECTIVE:   Anne Petersen is a 81 y.o. female seen for a follow up visit regarding the following:     Chief Complaint   Patient presents with    Congestive Heart Failure       HPI:  She presented recently to establish new cardiac care with a history of echocardiogram in  demonstrating moderate aortic stenosis but normal left ventricular function. She carries a history of pulmonary hypertension as well. Regardless she denies any symptoms of RV failure. She denies any symptoms of severe aortic stenosis and specifically denies angina, syncope, or CHF. She has chronic lower extremity dependent edema which is worse at the end  of the day. She denies orthopnea or PND. She has no prior coronary history. She is wrapping her legs each day with the help of her . She is encouraged to continue with lifestyle modification including diet, weight loss, leg elevation and leg wrapping as needed. .. The pathophysiology and progression of aortic stenosis was discussed with her today. Aggressive weight loss measures need to be employed over the next year or two in order to improve her postoperative morbidity/mortality if and when it comes time to operate with SAVR/TAVR. Echo 2022:  Left Ventricle Normal left ventricular systolic function with a visually estimated EF of 60 - 65%. Left ventricle size is normal. Mildly increased wall thickness. Findings consistent with concentric hypertrophy. Normal wall motion. Diastolic dysfunction present with normal LV EF. Normal left ventricular filling pressure. Left Atrium Left atrium size is normal. LA Vol Index is  27 ml/m2. Right Ventricle Right ventricle size is normal. Normal wall thickness. Normal systolic function.    Right Atrium Right atrium size is normal.   Aortic Valve Tricuspid valve. Moderately calcified cusp. No regurgitation. Moderate stenosis of the aortic valve. Noted by the apical view. AV mean gradient is 22 mmHg. AV peak gradient is 40 mmHg. AV peak velocity is 3.1 m/s. LVOT:AV VTI Index is 0.42. LVOT diameter is 2.2 cm. AV Stroke Volume index is 41.5 mL/m2. Mitral Valve Valve structure is normal. Mildly calcified leaflet, at the posterior leaflet. Moderate annular calcification at the posterior leaflet of the mitral valve. No regurgitation. No stenosis noted. Tricuspid Valve Valve structure is normal. Mild regurgitation. No stenosis noted. Normal RVSP. The estimated RVSP is 29 mmHg. Pulmonic Valve Valve structure is normal. No regurgitation. No stenosis noted. Pulmonary Artery Normal pulmonary arteries. Aorta Normal sized aortic root and ascending aorta. IVC/Hepatic Veins IVC diameter is less than or equal to 21 mm and decreases greater than 50% during inspiration; therefore the estimated right atrial pressure is normal (~3 mmHg). IVC size is normal.   Pericardium No pericardial effusion. Past Medical History, Past Surgical History, Family history, Social History, and Medications were all reviewed with the patient today and updated as necessary. Current Outpatient Medications   Medication Sig Dispense Refill    amLODIPine (NORVASC) 5 MG tablet Take 1 tablet by mouth daily 90 tablet 3    metoprolol succinate (TOPROL XL) 50 MG extended release tablet Take 1 tablet by mouth daily 90 tablet 3    furosemide (LASIX) 40 MG tablet TAKE 1 TABLET EVERY MORNING AND TAKE 1/2 TABLET EVERY EVENING      metFORMIN (GLUCOPHAGE) 500 MG tablet Take 1,000 mg by mouth 2 times daily (with meals)      potassium chloride (KLOR-CON) 10 MEQ extended release tablet TAKE 1 TABLET EVERY DAY      ramipril (ALTACE) 10 MG capsule TAKE 1 CAPSULE EVERY DAY       No current facility-administered medications for this visit.             No Known Allergies    Patient Active Problem List    Diagnosis Date Noted    Chronic venous hypertension (idiopathic) with ulcer and inflammation of bilateral lower extremity (CODE) ([facility] 07/01/2022     Priority: Medium    Nonrheumatic aortic valve stenosis 04/28/2022    Ob[facility], morbid ([facility] 01/08/2018    HTN (hypertension) 01/11/2013    Arthritis of knee 01/11/2013    Venous incompetence 01/11/2013     Overview Note:     LE/Chronic venous insufficency        CHF (congestive heart failure) ([facility] 01/11/2013     Overview Note:     Secondary to asthma        Pulmonary HTN ([facility] 01/11/2013    DM (diabetes mellitus) ([facility] 01/11/2013     Overview Note:     4/2008        Migraines 01/11/2013        Past Surgical History:   Procedure Laterality Date    HERNIA REPAIR         Family History   Problem Relation Age of Onset    Liver Disease Paternal Grandmother         cirrhosis - non drinker    Heart Disease Maternal Grandmother     Liver Disease Paternal Uncle         cirrhosis - nondrinker    Liver Disease Paternal Aunt         cirrhosis - nondrinker    Hypertension Mother     Parkinsonism Father     Heart Disease Mother     Heart Disease Paternal Grandfather     Lung Disease Father         copd        Social History     Tobacco Use    Smoking status: Never    Smokeless tobacco: Never   Substance Use Topics    Alcohol use: No       ROS:    Review of Systems   Constitutional:  Positive for fatigue. Negative for appetite change, chills and diaphoresis. HENT:  Negative for congestion, mouth sores, nosebleeds, sore throat and tinnitus. Eyes:  Negative for photophobia and visual disturbance. Respiratory:  Negative for cough and shortness of breath. Cardiovascular:  Positive for leg swelling. Negative for chest pain and palpitations. Gastrointestinal:  Negative for abdominal distention, abdominal pain, constipation and diarrhea. Endocrine: Negative for cold intolerance, heat intolerance, polydipsia and polyuria. Genitourinary:  Negative for dysuria and hematuria. Musculoskeletal:  Negative for arthralgias, joint swelling and myalgias. Skin:  Negative for rash. Allergic/Immunologic: Negative for environmental allergies and food allergies. Neurological:  Negative for dizziness, seizures, syncope and light-headedness. Hematological:  Negative for adenopathy. Does not bruise/bleed easily. Psychiatric/Behavioral:  Negative for agitation, behavioral problems, dysphoric mood and hallucinations. The patient is not nervous/anxious. PHYSICAL EXAM:     Vitals:    12/12/22 1247   BP: (!) 124/57   Pulse: 82   Weight: 267 lb (121.1 kg)   Height: 5' 2\" (1.575 m)      Wt Readings from Last 3 Encounters:   12/12/22 267 lb (121.1 kg)   12/05/22 269 lb (122 kg)   08/02/22 269 lb (122 kg)      BP Readings from Last 3 Encounters:   12/12/22 (!) 124/57   12/05/22 (!) 140/70   08/09/22 (!) 153/69        Physical Exam  Constitutional:       Appearance: Normal appearance. She is normal weight. HENT:      Head: Normocephalic and atraumatic. Nose: Nose normal.      Mouth/Throat:      Mouth: Mucous membranes are moist.      Pharynx: Oropharynx is clear. Eyes:      Extraocular Movements: Extraocular movements intact. Pupils: Pupils are equal, round, and reactive to light. Neck:      Vascular: No carotid bruit or JVD. Cardiovascular:      Rate and Rhythm: Normal rate and regular rhythm. Heart sounds: Murmur (2-3/6 KAYLIE RUSB with soft S2, soft TR murmur) heard. No friction rub. No gallop. Pulmonary:      Effort: Pulmonary effort is normal.      Breath sounds: Normal breath sounds. No wheezing or rhonchi. Abdominal:      General: Abdomen is flat. Bowel sounds are normal. There is no distension. Palpations: Abdomen is soft. Tenderness: There is no abdominal tenderness. Musculoskeletal:         General: No swelling. Normal range of motion.       Cervical back: Normal range of motion and neck supple. No tenderness. Comments: Wrapped below both knees, tense non-pitting LE edema bilaterally above both wraps at knees   Skin:     General: Skin is warm and dry. Neurological:      General: No focal deficit present. Mental Status: She is alert and oriented to person, place, and time. Mental status is at baseline. Psychiatric:         Mood and Affect: Mood normal.         Behavior: Behavior normal.        Medical problems and test results were reviewed with the patient today. Lab Results   Component Value Date    CHOL 148 06/24/2022    CHOL 164 03/07/2022    CHOL 175 11/15/2021     Lab Results   Component Value Date    TRIG 68 06/24/2022    TRIG 78 03/07/2022    TRIG 92 11/15/2021     Lab Results   Component Value Date    HDL 56 06/24/2022    HDL 58 03/07/2022    HDL 58 11/15/2021     Lab Results   Component Value Date    LDLCALC 78.4 06/24/2022    LDLCALC 91 03/07/2022    LDLCALC 100 (H) 11/15/2021     Lab Results   Component Value Date    LABVLDL 13.6 06/24/2022    VLDL 15 03/07/2022    VLDL 17 11/15/2021     Lab Results   Component Value Date    CHOLHDLRATIO 2.6 06/24/2022        Lab Results   Component Value Date/Time     06/24/2022 02:58 PM    K 4.2 06/24/2022 02:58 PM     06/24/2022 02:58 PM    CO2 26 06/24/2022 02:58 PM    BUN 10 06/24/2022 02:58 PM    CREATININE 0.80 06/24/2022 02:58 PM    GLUCOSE 130 06/24/2022 02:58 PM    CALCIUM 10.2 06/24/2022 02:58 PM         No results for input(s): WBC, HGB, HCT, MCV, PLT in the last 720 hours. Lab Results   Component Value Date    LABA1C 7.3 (H) 06/24/2022     Lab Results   Component Value Date     06/24/2022        No results found for: BNP     No results found for: TSHFT4, TSH     Results for orders placed or performed in visit on 12/12/22   EKG 12 lead    Impression    Sinus  Rhythm 82 bpm  -Right bundle branch block.    Normal axis  Nonspecific ST-T wave changes        ASSESSMENT and PLAN    Diagnoses and all orders for this visit:      1. Nonrheumatic aortic valve stenosis- moderate severity clinically asymptomatic aortic stenosis at present. Recheck echo in December of next year. Call immediately with any new onset angina,  syncope, CHF symptoms. 2. Primary hypertension- added Toprol-XL 50 mg nightly. Continue other meds. Minimize sodium. Excellent here and at home. 3. Type 2 diabetes mellitus without complication, without long-term current use of insulin ([facility]- Per PCP      4. Obesity, morbid ([address]- aggressive lifestyle modification with dieting, walking with cane as tolerated, and weight loss in the next year to. Aggressive weight loss will decrease perioperative morbidity/mortality. 5.  Pulmonary hypertension- normal right-sided chamber size and function on recent echo. PA pressures estimated at 29 mmHg this year. Reassess in December 2023. Return in about 6 months (around 6/12/2023).          Renay Greer MD  12/12/2022  12:56 PM

## 2022-12-12 ENCOUNTER — OFFICE VISIT (OUTPATIENT)
Dept: CARDIOLOGY CLINIC | Age: 72
End: 2022-12-12
Payer: MEDICARE

## 2022-12-12 VITALS
DIASTOLIC BLOOD PRESSURE: 57 MMHG | BODY MASS INDEX: 49.13 KG/M2 | HEIGHT: 62 IN | HEART RATE: 82 BPM | WEIGHT: 267 LBS | SYSTOLIC BLOOD PRESSURE: 124 MMHG

## 2022-12-12 DIAGNOSIS — I10 PRIMARY HYPERTENSION: ICD-10-CM

## 2022-12-12 DIAGNOSIS — I27.20 PULMONARY HTN (HCC): ICD-10-CM

## 2022-12-12 DIAGNOSIS — I87.333 CHRONIC VENOUS HYPERTENSION (IDIOPATHIC) WITH ULCER AND INFLAMMATION OF BILATERAL LOWER EXTREMITY (CODE) (HCC): ICD-10-CM

## 2022-12-12 DIAGNOSIS — I35.0 NONRHEUMATIC AORTIC VALVE STENOSIS: Primary | ICD-10-CM

## 2022-12-12 PROCEDURE — 3074F SYST BP LT 130 MM HG: CPT | Performed by: INTERNAL MEDICINE

## 2022-12-12 PROCEDURE — 99214 OFFICE O/P EST MOD 30 MIN: CPT | Performed by: INTERNAL MEDICINE

## 2022-12-12 PROCEDURE — 3078F DIAST BP <80 MM HG: CPT | Performed by: INTERNAL MEDICINE

## 2022-12-12 PROCEDURE — 1123F ACP DISCUSS/DSCN MKR DOCD: CPT | Performed by: INTERNAL MEDICINE

## 2022-12-12 PROCEDURE — 93000 ELECTROCARDIOGRAM COMPLETE: CPT | Performed by: INTERNAL MEDICINE

## 2022-12-12 RX ORDER — METOPROLOL SUCCINATE 50 MG/1
50 TABLET, EXTENDED RELEASE ORAL DAILY
Qty: 90 TABLET | Refills: 3 | Status: SHIPPED | OUTPATIENT
Start: 2022-12-12

## 2023-01-09 DIAGNOSIS — I10 PRIMARY HYPERTENSION: ICD-10-CM

## 2023-01-09 RX ORDER — FUROSEMIDE 40 MG/1
TABLET ORAL
Qty: 180 TABLET | Refills: 3 | Status: SHIPPED | OUTPATIENT
Start: 2023-01-09

## 2023-01-09 RX ORDER — AMLODIPINE BESYLATE 5 MG/1
5 TABLET ORAL DAILY
Qty: 90 TABLET | Refills: 3 | Status: SHIPPED | OUTPATIENT
Start: 2023-01-09

## 2023-01-09 RX ORDER — POTASSIUM CHLORIDE 750 MG/1
10 TABLET, FILM COATED, EXTENDED RELEASE ORAL DAILY
Qty: 90 TABLET | Refills: 3 | Status: SHIPPED | OUTPATIENT
Start: 2023-01-09

## 2023-01-09 RX ORDER — METOPROLOL SUCCINATE 50 MG/1
50 TABLET, EXTENDED RELEASE ORAL DAILY
Qty: 90 TABLET | Refills: 3 | Status: SHIPPED | OUTPATIENT
Start: 2023-01-09

## 2023-01-09 NOTE — TELEPHONE ENCOUNTER
[facility] called requesting all patient current prescriptions be sent to them as per patient and insurance

## 2023-04-14 ENCOUNTER — TELEPHONE (OUTPATIENT)
Dept: FAMILY MEDICINE CLINIC | Facility: CLINIC | Age: 73
End: 2023-04-14

## 2023-04-14 RX ORDER — RAMIPRIL 10 MG/1
10 CAPSULE ORAL DAILY
Qty: 30 CAPSULE | Refills: 1 | Status: SHIPPED | OUTPATIENT
Start: 2023-04-14

## 2023-05-01 SDOH — ECONOMIC STABILITY: FOOD INSECURITY: WITHIN THE PAST 12 MONTHS, THE FOOD YOU BOUGHT JUST DIDN'T LAST AND YOU DIDN'T HAVE MONEY TO GET MORE.: PATIENT DECLINED

## 2023-05-01 SDOH — ECONOMIC STABILITY: TRANSPORTATION INSECURITY
IN THE PAST 12 MONTHS, HAS LACK OF TRANSPORTATION KEPT YOU FROM MEETINGS, WORK, OR FROM GETTING THINGS NEEDED FOR DAILY LIVING?: PATIENT DECLINED

## 2023-05-01 SDOH — ECONOMIC STABILITY: FOOD INSECURITY: WITHIN THE PAST 12 MONTHS, YOU WORRIED THAT YOUR FOOD WOULD RUN OUT BEFORE YOU GOT MONEY TO BUY MORE.: PATIENT DECLINED

## 2023-05-01 SDOH — ECONOMIC STABILITY: INCOME INSECURITY: HOW HARD IS IT FOR YOU TO PAY FOR THE VERY BASICS LIKE FOOD, HOUSING, MEDICAL CARE, AND HEATING?: PATIENT DECLINED

## 2023-05-01 SDOH — ECONOMIC STABILITY: HOUSING INSECURITY
IN THE LAST 12 MONTHS, WAS THERE A TIME WHEN YOU DID NOT HAVE A STEADY PLACE TO SLEEP OR SLEPT IN A SHELTER (INCLUDING NOW)?: PATIENT REFUSED

## 2023-05-03 ENCOUNTER — OFFICE VISIT (OUTPATIENT)
Dept: FAMILY MEDICINE CLINIC | Facility: CLINIC | Age: 73
End: 2023-05-03
Payer: COMMERCIAL

## 2023-05-03 VITALS
SYSTOLIC BLOOD PRESSURE: 155 MMHG | DIASTOLIC BLOOD PRESSURE: 79 MMHG | OXYGEN SATURATION: 98 % | WEIGHT: 263 LBS | TEMPERATURE: 97.3 F | RESPIRATION RATE: 16 BRPM | HEIGHT: 62 IN | BODY MASS INDEX: 48.4 KG/M2 | HEART RATE: 72 BPM

## 2023-05-03 DIAGNOSIS — I50.9 CONGESTIVE HEART FAILURE, UNSPECIFIED HF CHRONICITY, UNSPECIFIED HEART FAILURE TYPE (HCC): ICD-10-CM

## 2023-05-03 DIAGNOSIS — I87.2 VENOUS INCOMPETENCE: ICD-10-CM

## 2023-05-03 DIAGNOSIS — I35.0 NONRHEUMATIC AORTIC VALVE STENOSIS: ICD-10-CM

## 2023-05-03 DIAGNOSIS — M17.10 ARTHRITIS OF KNEE: ICD-10-CM

## 2023-05-03 DIAGNOSIS — I27.20 PULMONARY HTN (HCC): ICD-10-CM

## 2023-05-03 DIAGNOSIS — I10 PRIMARY HYPERTENSION: ICD-10-CM

## 2023-05-03 DIAGNOSIS — E11.65 TYPE 2 DIABETES MELLITUS WITH HYPERGLYCEMIA, WITHOUT LONG-TERM CURRENT USE OF INSULIN (HCC): Primary | ICD-10-CM

## 2023-05-03 PROBLEM — I87.333 CHRONIC VENOUS HYPERTENSION (IDIOPATHIC) WITH ULCER AND INFLAMMATION OF BILATERAL LOWER EXTREMITY (CODE): Status: RESOLVED | Noted: 2022-07-01 | Resolved: 2023-05-03

## 2023-05-03 PROCEDURE — 99214 OFFICE O/P EST MOD 30 MIN: CPT | Performed by: FAMILY MEDICINE

## 2023-05-03 PROCEDURE — 3078F DIAST BP <80 MM HG: CPT | Performed by: FAMILY MEDICINE

## 2023-05-03 PROCEDURE — 1123F ACP DISCUSS/DSCN MKR DOCD: CPT | Performed by: FAMILY MEDICINE

## 2023-05-03 PROCEDURE — 3077F SYST BP >= 140 MM HG: CPT | Performed by: FAMILY MEDICINE

## 2023-05-03 RX ORDER — AMLODIPINE BESYLATE 5 MG/1
5 TABLET ORAL DAILY
Qty: 90 TABLET | Refills: 3 | Status: SHIPPED | OUTPATIENT
Start: 2023-05-03

## 2023-05-03 RX ORDER — POTASSIUM CHLORIDE 750 MG/1
10 TABLET, FILM COATED, EXTENDED RELEASE ORAL DAILY
Qty: 90 TABLET | Refills: 3 | Status: SHIPPED | OUTPATIENT
Start: 2023-05-03

## 2023-05-03 RX ORDER — METOPROLOL SUCCINATE 50 MG/1
50 TABLET, EXTENDED RELEASE ORAL DAILY
Qty: 90 TABLET | Refills: 3 | Status: SHIPPED | OUTPATIENT
Start: 2023-05-03

## 2023-05-03 RX ORDER — RAMIPRIL 10 MG/1
CAPSULE ORAL
Qty: 90 CAPSULE | Refills: 3 | Status: SHIPPED | OUTPATIENT
Start: 2023-05-03

## 2023-05-03 RX ORDER — FUROSEMIDE 40 MG/1
TABLET ORAL
Qty: 135 TABLET | Refills: 3 | Status: SHIPPED | OUTPATIENT
Start: 2023-05-03

## 2023-05-03 ASSESSMENT — PATIENT HEALTH QUESTIONNAIRE - PHQ9
2. FEELING DOWN, DEPRESSED OR HOPELESS: 0
SUM OF ALL RESPONSES TO PHQ QUESTIONS 1-9: 0
1. LITTLE INTEREST OR PLEASURE IN DOING THINGS: 0
SUM OF ALL RESPONSES TO PHQ QUESTIONS 1-9: 0
SUM OF ALL RESPONSES TO PHQ9 QUESTIONS 1 & 2: 0

## 2023-05-03 NOTE — PROGRESS NOTES
[address] [facility] [address]  Phone: [phone] Fax [phone]  Brice Wilkins MD  5/3/2023           Ms. Shawn Hernandez  is a 81y.o.  year old  female patient who comes in to follow up on blood pressure. She takes amlodipine 5 mg, Lasix 40 mg in the morning and 1/2 tablet the evening, metoprolol 50 mg daily with the extended release and Altace 10 mg daily. Her blood pressure usually runs normal at home. She does see cardiology about her aortic valve stenosis and everything has been going well. She does have a lot of trouble with knee arthritis and has to use a walker to walk. She was not sure if she could have her knees replaced due to her morbid obesity. Her diabetes has been well controlled. She has lost about 40 pounds. She did have venous stasis ulcers but went to wound management and they helped get them healed and they are now gone. Ms. Shawn Hernandez  has  has a past medical history of Arthritis of knee, CHF (congestive heart failure) ([address] DM (diabetes mellitus) ([address] High risk medication use, HTN (hypertension), Migraines, Pulmonary HTN ([address] and Venous incompetence. Ms. Shawn Hernandez  has  has a past surgical history that includes hernia repair. Ms. Shawn Hernandez   Current Outpatient Medications   Medication Sig Dispense Refill    ramipril (ALTACE) 10 MG capsule TAKE 1 CAPSULE EVERY DAY 90 capsule 3    amLODIPine (NORVASC) 5 MG tablet Take 1 tablet by mouth daily 90 tablet 3    furosemide (LASIX) 40 MG tablet TAKE 1 TABLET EVERY MORNING AND TAKE 1/2 TABLET EVERY EVENING 135 tablet 3    metFORMIN (GLUCOPHAGE) 500 MG tablet One po bid 180 tablet 3    metoprolol succinate (TOPROL XL) 50 MG extended release tablet Take 1 tablet by mouth daily 90 tablet 3    potassium chloride (KLOR-CON) 10 MEQ extended release tablet Take 1 tablet by mouth daily TAKE 1 TABLET EVERY DAY 90 tablet 3     No current facility-administered medications for this visit.

## 2023-07-03 ENCOUNTER — NURSE ONLY (OUTPATIENT)
Dept: FAMILY MEDICINE CLINIC | Facility: CLINIC | Age: 73
End: 2023-07-03

## 2023-07-03 DIAGNOSIS — I10 PRIMARY HYPERTENSION: ICD-10-CM

## 2023-07-03 DIAGNOSIS — E11.65 TYPE 2 DIABETES MELLITUS WITH HYPERGLYCEMIA, WITHOUT LONG-TERM CURRENT USE OF INSULIN (HCC): ICD-10-CM

## 2023-07-03 LAB
ALBUMIN SERPL-MCNC: 3.7 G/DL (ref 3.2–4.6)
ALBUMIN/GLOB SERPL: 0.9 (ref 0.4–1.6)
ALP SERPL-CCNC: 88 U/L (ref 50–136)
ALT SERPL-CCNC: 26 U/L (ref 12–65)
ANION GAP SERPL CALC-SCNC: 7 MMOL/L (ref 2–11)
AST SERPL-CCNC: 16 U/L (ref 15–37)
BILIRUB SERPL-MCNC: 0.3 MG/DL (ref 0.2–1.1)
BUN SERPL-MCNC: 16 MG/DL (ref 8–23)
CALCIUM SERPL-MCNC: 9.7 MG/DL (ref 8.3–10.4)
CHLORIDE SERPL-SCNC: 102 MMOL/L (ref 101–110)
CHOLEST SERPL-MCNC: 133 MG/DL
CO2 SERPL-SCNC: 24 MMOL/L (ref 21–32)
CREAT SERPL-MCNC: 0.8 MG/DL (ref 0.6–1)
CREAT UR-MCNC: 16 MG/DL
GLOBULIN SER CALC-MCNC: 4.3 G/DL (ref 2.8–4.5)
GLUCOSE SERPL-MCNC: 139 MG/DL (ref 65–100)
HDLC SERPL-MCNC: 46 MG/DL (ref 40–60)
HDLC SERPL: 2.9
LDLC SERPL CALC-MCNC: 69.2 MG/DL
MICROALBUMIN UR-MCNC: 0.52 MG/DL
MICROALBUMIN/CREAT UR-RTO: 33 MG/G (ref 0–30)
POTASSIUM SERPL-SCNC: 4.5 MMOL/L (ref 3.5–5.1)
PROT SERPL-MCNC: 8 G/DL (ref 6.3–8.2)
SODIUM SERPL-SCNC: 133 MMOL/L (ref 133–143)
TRIGL SERPL-MCNC: 89 MG/DL (ref 35–150)
VLDLC SERPL CALC-MCNC: 17.8 MG/DL (ref 6–23)

## 2023-07-04 LAB
EST. AVERAGE GLUCOSE BLD GHB EST-MCNC: 143 MG/DL
HBA1C MFR BLD: 6.6 % (ref 4.8–5.6)

## 2023-07-11 ENCOUNTER — OFFICE VISIT (OUTPATIENT)
Dept: FAMILY MEDICINE CLINIC | Facility: CLINIC | Age: 73
End: 2023-07-11
Payer: COMMERCIAL

## 2023-07-11 VITALS
DIASTOLIC BLOOD PRESSURE: 68 MMHG | OXYGEN SATURATION: 99 % | WEIGHT: 260 LBS | HEIGHT: 62 IN | TEMPERATURE: 97.3 F | HEART RATE: 66 BPM | BODY MASS INDEX: 47.84 KG/M2 | RESPIRATION RATE: 16 BRPM | SYSTOLIC BLOOD PRESSURE: 152 MMHG

## 2023-07-11 DIAGNOSIS — I87.2 VENOUS INCOMPETENCE: ICD-10-CM

## 2023-07-11 DIAGNOSIS — I10 PRIMARY HYPERTENSION: ICD-10-CM

## 2023-07-11 DIAGNOSIS — E11.65 TYPE 2 DIABETES MELLITUS WITH HYPERGLYCEMIA, WITHOUT LONG-TERM CURRENT USE OF INSULIN (HCC): ICD-10-CM

## 2023-07-11 DIAGNOSIS — I27.20 PULMONARY HTN (HCC): ICD-10-CM

## 2023-07-11 DIAGNOSIS — I35.0 NONRHEUMATIC AORTIC VALVE STENOSIS: ICD-10-CM

## 2023-07-11 DIAGNOSIS — I50.9 CONGESTIVE HEART FAILURE, UNSPECIFIED HF CHRONICITY, UNSPECIFIED HEART FAILURE TYPE (HCC): ICD-10-CM

## 2023-07-11 DIAGNOSIS — Z00.00 MEDICARE ANNUAL WELLNESS VISIT, SUBSEQUENT: Primary | ICD-10-CM

## 2023-07-11 PROCEDURE — G0439 PPPS, SUBSEQ VISIT: HCPCS | Performed by: FAMILY MEDICINE

## 2023-07-11 PROCEDURE — 1123F ACP DISCUSS/DSCN MKR DOCD: CPT | Performed by: FAMILY MEDICINE

## 2023-07-11 PROCEDURE — 3078F DIAST BP <80 MM HG: CPT | Performed by: FAMILY MEDICINE

## 2023-07-11 PROCEDURE — 3077F SYST BP >= 140 MM HG: CPT | Performed by: FAMILY MEDICINE

## 2023-07-11 PROCEDURE — 3044F HG A1C LEVEL LT 7.0%: CPT | Performed by: FAMILY MEDICINE

## 2023-07-11 SDOH — HEALTH STABILITY: PHYSICAL HEALTH
ON AVERAGE, HOW MANY DAYS PER WEEK DO YOU ENGAGE IN MODERATE TO STRENUOUS EXERCISE (LIKE A BRISK WALK)?: PATIENT DECLINED

## 2023-07-11 ASSESSMENT — PATIENT HEALTH QUESTIONNAIRE - PHQ9
SUM OF ALL RESPONSES TO PHQ9 QUESTIONS 1 & 2: 0
2. FEELING DOWN, DEPRESSED OR HOPELESS: 0
SUM OF ALL RESPONSES TO PHQ QUESTIONS 1-9: 0
1. LITTLE INTEREST OR PLEASURE IN DOING THINGS: 0
SUM OF ALL RESPONSES TO PHQ QUESTIONS 1-9: 0

## 2023-07-11 ASSESSMENT — LIFESTYLE VARIABLES
HOW OFTEN DO YOU HAVE SIX OR MORE DRINKS ON ONE OCCASION: 1
HOW MANY STANDARD DRINKS CONTAINING ALCOHOL DO YOU HAVE ON A TYPICAL DAY: PATIENT DOES NOT DRINK
HOW OFTEN DO YOU HAVE A DRINK CONTAINING ALCOHOL: 1
HOW OFTEN DO YOU HAVE A DRINK CONTAINING ALCOHOL: NEVER
HOW MANY STANDARD DRINKS CONTAINING ALCOHOL DO YOU HAVE ON A TYPICAL DAY: 0

## 2023-07-11 NOTE — PROGRESS NOTES
Medicare Annual Wellness Visit    Beto Persaud is here for Medicare AWV    Assessment & Plan   Medicare annual wellness visit, subsequent  Congestive heart failure, unspecified HF chronicity, unspecified heart failure type ([address])  Primary hypertension  Nonrheumatic aortic valve stenosis  Pulmonary HTN ([address])  Venous incompetence  Type 2 diabetes mellitus with hyperglycemia, without long-term current use of insulin (HCC)  Body mass index (BMI) 45.0-49.9, adult ([address])  Recommendations for Preventive Services Due: see orders and patient instructions/AVS.  Recommended screening schedule for the next 5-10 years is provided to the patient in written form: see Patient Instructions/AVS.     No follow-ups on file. Subjective   The following acute and/or chronic problems were also addressed today:    She also comes in to check on diabetes, hypertension, and high cholesterol. She does not check her sugars. ugars have been running unknown. Last eye exam was this year and she apparently had a retinal hemorrhage due to untreated hypertension. Feet have no problems as far as neuropathy but she does have trouble with swelling. Did not have a flu shot this year -does not want one. Did not have a pneumonia shot -does not want one not. Did have a tetanus shot tdap 2/7/13. Has not been working on diet and exercise. Blood pressure has not been under control       She does have a history of congestive heart failure, hypertension, pulmonary hypertension, diabetes, and morbid obesity. Is gotten to the point where it is difficult for her to walk because she is retaining fluid and has gained so much weight. The open areas on her legs have resolved. She wraps them daily. She does see cardiology about her aortic valve stenosis and everything has been going well. She does have a lot of trouble with knee arthritis and has to use a walker to walk. She is going to ortho for them.        Continue all above medications and follow-up in 6

## 2023-08-31 ENCOUNTER — OFFICE VISIT (OUTPATIENT)
Dept: ORTHOPEDIC SURGERY | Age: 73
End: 2023-08-31

## 2023-08-31 VITALS — WEIGHT: 260 LBS | BODY MASS INDEX: 47.84 KG/M2 | HEIGHT: 62 IN

## 2023-08-31 DIAGNOSIS — M25.561 RIGHT KNEE PAIN, UNSPECIFIED CHRONICITY: ICD-10-CM

## 2023-08-31 DIAGNOSIS — M17.12 PRIMARY OSTEOARTHRITIS OF LEFT KNEE: ICD-10-CM

## 2023-08-31 DIAGNOSIS — M25.562 LEFT KNEE PAIN, UNSPECIFIED CHRONICITY: Primary | ICD-10-CM

## 2023-08-31 DIAGNOSIS — M17.11 PRIMARY OSTEOARTHRITIS OF RIGHT KNEE: ICD-10-CM

## 2023-08-31 RX ORDER — POTASSIUM CHLORIDE 750 MG/1
TABLET, EXTENDED RELEASE ORAL
COMMUNITY
Start: 2023-08-12

## 2023-08-31 RX ORDER — METHYLPREDNISOLONE ACETATE 40 MG/ML
80 INJECTION, SUSPENSION INTRA-ARTICULAR; INTRALESIONAL; INTRAMUSCULAR; SOFT TISSUE ONCE
Status: COMPLETED | OUTPATIENT
Start: 2023-08-31 | End: 2023-08-31

## 2023-08-31 RX ADMIN — METHYLPREDNISOLONE ACETATE 80 MG: 40 INJECTION, SUSPENSION INTRA-ARTICULAR; INTRALESIONAL; INTRAMUSCULAR; SOFT TISSUE at 09:44

## 2023-08-31 RX ADMIN — METHYLPREDNISOLONE ACETATE 80 MG: 40 INJECTION, SUSPENSION INTRA-ARTICULAR; INTRALESIONAL; INTRAMUSCULAR; SOFT TISSUE at 09:43

## 2023-08-31 NOTE — PROGRESS NOTES
Procedure Note    Patient Name: Hernán Olivera    Date of Procedure: August 31, 2023    Preoperative Diagnosis:     ICD-10-CM    1. Left knee pain, unspecified chronicity  M25.562 XR Knee Bilateral Standard Extended VW      2. Right knee pain, unspecified chronicity  M25.561 XR Knee Bilateral Standard Extended VW      3. Primary osteoarthritis of left knee  M17.12 methylPREDNISolone acetate (DEPO-MEDROL) injection 80 mg     TN ARTHROCENTESIS ASPIR&/INJ MAJOR JT/BURSA W/O US     diclofenac sodium (VOLTAREN) 1 % GEL      4. Primary osteoarthritis of right knee  M17.11 methylPREDNISolone acetate (DEPO-MEDROL) injection 80 mg     TN ARTHROCENTESIS ASPIR&/INJ MAJOR JT/BURSA W/O US     diclofenac sodium (VOLTAREN) 1 % GEL          Post Operative Diagnosis: same    Procedure: bilateral knees joint Injection    Consent: The patient was given the opportunity to ask questions regarding the procedure and its associated risks. Procedure: The patient was placed in a sitting position on the exam table and both knees were prepped in the usual sterile manner. Each knee was anesthestized with 3 cc of xylocaine with a 25 gauge needle, the needle was left in place a total of 2cc of 40mg methylprednisolone and 5 cc of Lidocaine was slowly injected. The patient tolerated the procedure well. The injection areas were cleaned and Band-Aid applied. No excessive bleeding was noted. Patient dressed and was discharged to home with instructions. Discussion:  The patient tolerated the procedure well.     Follow up: four months        JOHN Zhou  August 31, 2023

## 2023-09-06 ASSESSMENT — ENCOUNTER SYMPTOMS
COUGH: 0
CONSTIPATION: 0
DIARRHEA: 0
SHORTNESS OF BREATH: 0
ABDOMINAL PAIN: 0
PHOTOPHOBIA: 0
ABDOMINAL DISTENTION: 0
SORE THROAT: 0

## 2023-09-07 ENCOUNTER — OFFICE VISIT (OUTPATIENT)
Age: 73
End: 2023-09-07
Payer: COMMERCIAL

## 2023-09-07 VITALS
BODY MASS INDEX: 48.4 KG/M2 | WEIGHT: 263 LBS | SYSTOLIC BLOOD PRESSURE: 134 MMHG | DIASTOLIC BLOOD PRESSURE: 80 MMHG | HEIGHT: 62 IN | HEART RATE: 88 BPM

## 2023-09-07 DIAGNOSIS — E11.65 TYPE 2 DIABETES MELLITUS WITH HYPERGLYCEMIA, WITHOUT LONG-TERM CURRENT USE OF INSULIN (HCC): ICD-10-CM

## 2023-09-07 DIAGNOSIS — I27.20 PULMONARY HTN (HCC): ICD-10-CM

## 2023-09-07 DIAGNOSIS — I35.0 NONRHEUMATIC AORTIC VALVE STENOSIS: Primary | ICD-10-CM

## 2023-09-07 DIAGNOSIS — I10 PRIMARY HYPERTENSION: ICD-10-CM

## 2023-09-07 PROCEDURE — 99214 OFFICE O/P EST MOD 30 MIN: CPT | Performed by: INTERNAL MEDICINE

## 2023-09-07 PROCEDURE — 3079F DIAST BP 80-89 MM HG: CPT | Performed by: INTERNAL MEDICINE

## 2023-09-07 PROCEDURE — 3044F HG A1C LEVEL LT 7.0%: CPT | Performed by: INTERNAL MEDICINE

## 2023-09-07 PROCEDURE — 3075F SYST BP GE 130 - 139MM HG: CPT | Performed by: INTERNAL MEDICINE

## 2023-09-07 PROCEDURE — 1123F ACP DISCUSS/DSCN MKR DOCD: CPT | Performed by: INTERNAL MEDICINE

## 2023-09-07 NOTE — PROGRESS NOTES
07/03/2023        No results found for: BNP     No results found for: TSHFT4, TSH     No results found for any visits on 09/07/23. ASSESSMENT and PLAN    Diagnoses and all orders for this visit:      1. Nonrheumatic aortic valve stenosis- moderate severity clinically asymptomatic aortic stenosis at present. Recheck echo prior to next visit. Call immediately with any new onset angina,  syncope, CHF symptoms. 2. Primary hypertension- added Toprol-XL 50 mg nightly. Continue other meds. Minimize sodium. Excellent here and at home. Continue low-sodium diet and current medication without change. 3. Type 2 diabetes mellitus without complication, without long-term current use of insulin ([address])- Per PCP      4. Obesity, morbid ([address])- aggressive lifestyle modification with dieting, walking with cane as tolerated, and weight loss in the next year to. Aggressive weight loss will decrease perioperative morbidity/mortality. 5.  Pulmonary hypertension- normal right-sided chamber size and function on recent echo. PA pressures estimated at 29 mmHg this year. Reassess by echo in 6 months prior to next visit. Return in about 6 months (around 3/7/2024).          Kenny Aldana MD  9/7/2023  3:21 PM

## 2023-10-05 ENCOUNTER — TELEPHONE (OUTPATIENT)
Age: 73
End: 2023-10-05

## 2023-12-28 ENCOUNTER — TELEPHONE (OUTPATIENT)
Dept: FAMILY MEDICINE CLINIC | Facility: CLINIC | Age: 73
End: 2023-12-28

## 2023-12-28 DIAGNOSIS — E11.65 TYPE 2 DIABETES MELLITUS WITH HYPERGLYCEMIA, WITHOUT LONG-TERM CURRENT USE OF INSULIN (HCC): Primary | ICD-10-CM

## 2023-12-29 ENCOUNTER — NURSE ONLY (OUTPATIENT)
Dept: FAMILY MEDICINE CLINIC | Facility: CLINIC | Age: 73
End: 2023-12-29

## 2023-12-29 DIAGNOSIS — E11.65 TYPE 2 DIABETES MELLITUS WITH HYPERGLYCEMIA, WITHOUT LONG-TERM CURRENT USE OF INSULIN (HCC): ICD-10-CM

## 2023-12-29 LAB
ALBUMIN SERPL-MCNC: 4.1 G/DL (ref 3.2–4.6)
ALBUMIN/GLOB SERPL: 1.1 (ref 0.4–1.6)
ALP SERPL-CCNC: 80 U/L (ref 50–136)
ALT SERPL-CCNC: 23 U/L (ref 12–65)
ANION GAP SERPL CALC-SCNC: 5 MMOL/L (ref 2–11)
AST SERPL-CCNC: 13 U/L (ref 15–37)
BILIRUB SERPL-MCNC: 0.3 MG/DL (ref 0.2–1.1)
BUN SERPL-MCNC: 22 MG/DL (ref 8–23)
CALCIUM SERPL-MCNC: 9.8 MG/DL (ref 8.3–10.4)
CHLORIDE SERPL-SCNC: 103 MMOL/L (ref 103–113)
CO2 SERPL-SCNC: 29 MMOL/L (ref 21–32)
CREAT SERPL-MCNC: 0.8 MG/DL (ref 0.6–1)
EST. AVERAGE GLUCOSE BLD GHB EST-MCNC: 137 MG/DL
GLOBULIN SER CALC-MCNC: 3.6 G/DL (ref 2.8–4.5)
GLUCOSE SERPL-MCNC: 174 MG/DL (ref 65–100)
HBA1C MFR BLD: 6.4 % (ref 4.8–5.6)
POTASSIUM SERPL-SCNC: 4.3 MMOL/L (ref 3.5–5.1)
PROT SERPL-MCNC: 7.7 G/DL (ref 6.3–8.2)
SODIUM SERPL-SCNC: 137 MMOL/L (ref 136–146)

## 2024-01-01 NOTE — WOUND CARE
Discharge Instructions for  Gildardo Bowser  [address] [place] [address]  [address], [address]  Phone [phone]   Fax [phone]      NAME:  Lowell Coto OF BIRTH:  1950  MEDICAL RECORD NUMBER:  [id number]  DATE:  8/9/2022    Return Appointment:   Discharge from wound center at this time. Instructions: Bilateral lower legs  Wounds are healed! Wear compression wraps to bilateral lower legs daily; place on first in morning and may remove at night for bathing and sleeping. Patient may shower as previously without wound dressings. Apply moisturizer 1-2 times daily. Elevate legs when sitting. Avoid prolonged standing or sitting with legs in dependent position. Eliminate salt intake as this promotes fluid retention and swelling. Should you experience increased redness, swelling, pain, foul odor, size of wound(s), or have a temperature over 101 degrees please contact the [address] [place] [address] Road at [phone] or if after hours contact your primary care physician or go to the hospital emergency department. PLEASE NOTE: IF YOU ARE UNABLE TO OBTAIN WOUND SUPPLIES, CONTINUE TO USE THE SUPPLIES YOU HAVE AVAILABLE UNTIL YOU ARE ABLE TO REACH US. IT IS MOST IMPORTANT TO KEEP THE WOUND COVERED AT ALL TIMES.     Electronically signed Iqra Núñez PT, [facility] on 8/9/2022 at 11:42 AM difficulty weaning from 02

## 2024-01-10 ENCOUNTER — OFFICE VISIT (OUTPATIENT)
Dept: FAMILY MEDICINE CLINIC | Facility: CLINIC | Age: 74
End: 2024-01-10
Payer: MEDICARE

## 2024-01-10 VITALS
RESPIRATION RATE: 16 BRPM | TEMPERATURE: 97.2 F | WEIGHT: 263 LBS | HEIGHT: 62 IN | OXYGEN SATURATION: 98 % | HEART RATE: 80 BPM | BODY MASS INDEX: 48.4 KG/M2 | DIASTOLIC BLOOD PRESSURE: 69 MMHG | SYSTOLIC BLOOD PRESSURE: 128 MMHG

## 2024-01-10 DIAGNOSIS — I27.20 PULMONARY HTN (HCC): ICD-10-CM

## 2024-01-10 DIAGNOSIS — I50.9 CONGESTIVE HEART FAILURE, UNSPECIFIED HF CHRONICITY, UNSPECIFIED HEART FAILURE TYPE (HCC): ICD-10-CM

## 2024-01-10 DIAGNOSIS — I35.0 NONRHEUMATIC AORTIC VALVE STENOSIS: ICD-10-CM

## 2024-01-10 DIAGNOSIS — I87.2 VENOUS INCOMPETENCE: ICD-10-CM

## 2024-01-10 DIAGNOSIS — I10 PRIMARY HYPERTENSION: ICD-10-CM

## 2024-01-10 DIAGNOSIS — E11.65 TYPE 2 DIABETES MELLITUS WITH HYPERGLYCEMIA, WITHOUT LONG-TERM CURRENT USE OF INSULIN (HCC): Primary | ICD-10-CM

## 2024-01-10 DIAGNOSIS — M17.10 ARTHRITIS OF KNEE: ICD-10-CM

## 2024-01-10 PROCEDURE — 3074F SYST BP LT 130 MM HG: CPT | Performed by: FAMILY MEDICINE

## 2024-01-10 PROCEDURE — 1090F PRES/ABSN URINE INCON ASSESS: CPT | Performed by: FAMILY MEDICINE

## 2024-01-10 PROCEDURE — G8484 FLU IMMUNIZE NO ADMIN: HCPCS | Performed by: FAMILY MEDICINE

## 2024-01-10 PROCEDURE — 3046F HEMOGLOBIN A1C LEVEL >9.0%: CPT | Performed by: FAMILY MEDICINE

## 2024-01-10 PROCEDURE — 2022F DILAT RTA XM EVC RTNOPTHY: CPT | Performed by: FAMILY MEDICINE

## 2024-01-10 PROCEDURE — 99214 OFFICE O/P EST MOD 30 MIN: CPT | Performed by: FAMILY MEDICINE

## 2024-01-10 PROCEDURE — G8417 CALC BMI ABV UP PARAM F/U: HCPCS | Performed by: FAMILY MEDICINE

## 2024-01-10 PROCEDURE — 3017F COLORECTAL CA SCREEN DOC REV: CPT | Performed by: FAMILY MEDICINE

## 2024-01-10 PROCEDURE — 1036F TOBACCO NON-USER: CPT | Performed by: FAMILY MEDICINE

## 2024-01-10 PROCEDURE — 3078F DIAST BP <80 MM HG: CPT | Performed by: FAMILY MEDICINE

## 2024-01-10 PROCEDURE — G8427 DOCREV CUR MEDS BY ELIG CLIN: HCPCS | Performed by: FAMILY MEDICINE

## 2024-01-10 PROCEDURE — G8400 PT W/DXA NO RESULTS DOC: HCPCS | Performed by: FAMILY MEDICINE

## 2024-01-10 PROCEDURE — 1123F ACP DISCUSS/DSCN MKR DOCD: CPT | Performed by: FAMILY MEDICINE

## 2024-01-10 RX ORDER — RAMIPRIL 10 MG/1
CAPSULE ORAL
Qty: 90 CAPSULE | Refills: 3 | Status: SHIPPED | OUTPATIENT
Start: 2024-01-10

## 2024-01-10 RX ORDER — AMLODIPINE BESYLATE 5 MG/1
5 TABLET ORAL DAILY
Qty: 90 TABLET | Refills: 3 | Status: SHIPPED | OUTPATIENT
Start: 2024-01-10

## 2024-01-10 RX ORDER — METOPROLOL SUCCINATE 50 MG/1
50 TABLET, EXTENDED RELEASE ORAL DAILY
Qty: 90 TABLET | Refills: 3 | Status: SHIPPED | OUTPATIENT
Start: 2024-01-10

## 2024-01-10 RX ORDER — FUROSEMIDE 40 MG/1
TABLET ORAL
Qty: 135 TABLET | Refills: 3 | Status: SHIPPED | OUTPATIENT
Start: 2024-01-10

## 2024-01-10 RX ORDER — POTASSIUM CHLORIDE 750 MG/1
10 TABLET, FILM COATED, EXTENDED RELEASE ORAL DAILY
Qty: 90 TABLET | Refills: 3 | Status: SHIPPED | OUTPATIENT
Start: 2024-01-10

## 2024-01-10 ASSESSMENT — PATIENT HEALTH QUESTIONNAIRE - PHQ9
2. FEELING DOWN, DEPRESSED OR HOPELESS: 0
SUM OF ALL RESPONSES TO PHQ QUESTIONS 1-9: 0
SUM OF ALL RESPONSES TO PHQ QUESTIONS 1-9: 0
1. LITTLE INTEREST OR PLEASURE IN DOING THINGS: 0
SUM OF ALL RESPONSES TO PHQ QUESTIONS 1-9: 0
SUM OF ALL RESPONSES TO PHQ QUESTIONS 1-9: 0
SUM OF ALL RESPONSES TO PHQ9 QUESTIONS 1 & 2: 0

## 2024-01-10 NOTE — PROGRESS NOTES
ramipril (ALTACE) 10 MG capsule TAKE 1 CAPSULE EVERY DAY 90 capsule 3    amLODIPine (NORVASC) 5 MG tablet Take 1 tablet by mouth daily 90 tablet 3     No current facility-administered medications for this visit.       Ms. Ellis   Family History   Problem Relation Age of Onset    Liver Disease Paternal Grandmother         cirrhosis - non drinker    Heart Disease Maternal Grandmother     Liver Disease Paternal Uncle         cirrhosis - nondrinker    Liver Disease Paternal Aunt         cirrhosis - nondrinker    Hypertension Mother     Parkinsonism Father     Heart Disease Mother     Heart Disease Paternal Grandfather     Lung Disease Father         copd       Ms. Ellis    Social History     Socioeconomic History    Marital status:      Spouse name: Not on file    Number of children: Not on file    Years of education: Not on file    Highest education level: Not on file   Occupational History    Not on file   Tobacco Use    Smoking status: Never    Smokeless tobacco: Never   Substance and Sexual Activity    Alcohol use: No    Drug use: No    Sexual activity: Not on file   Other Topics Concern    Not on file   Social History Narrative    Not on file     Social Determinants of Health     Financial Resource Strain: Patient Declined (5/1/2023)    Overall Financial Resource Strain (CARDIA)     Difficulty of Paying Living Expenses: Patient declined   Food Insecurity: Not on file (5/1/2023)   Transportation Needs: Unknown (5/1/2023)    PRAPARE - Transportation     Lack of Transportation (Medical): Not on file     Lack of Transportation (Non-Medical): Patient declined   Physical Activity: Unknown (7/11/2023)    Exercise Vital Sign     Days of Exercise per Week: Patient declined     Minutes of Exercise per Session: Not on file   Stress: Not on file   Social Connections: Not on file   Intimate Partner Violence: Not on file   Housing Stability: Unknown (5/1/2023)    Housing Stability Vital Sign     Unable to Pay

## 2024-01-30 ENCOUNTER — OFFICE VISIT (OUTPATIENT)
Dept: ORTHOPEDIC SURGERY | Age: 74
End: 2024-01-30

## 2024-01-30 DIAGNOSIS — M17.11 PRIMARY OSTEOARTHRITIS OF RIGHT KNEE: ICD-10-CM

## 2024-01-30 DIAGNOSIS — M17.12 PRIMARY OSTEOARTHRITIS OF LEFT KNEE: Primary | ICD-10-CM

## 2024-01-30 RX ORDER — METHYLPREDNISOLONE ACETATE 40 MG/ML
80 INJECTION, SUSPENSION INTRA-ARTICULAR; INTRALESIONAL; INTRAMUSCULAR; SOFT TISSUE ONCE
Status: COMPLETED | OUTPATIENT
Start: 2024-01-30 | End: 2024-01-30

## 2024-01-30 RX ADMIN — METHYLPREDNISOLONE ACETATE 80 MG: 40 INJECTION, SUSPENSION INTRA-ARTICULAR; INTRALESIONAL; INTRAMUSCULAR; SOFT TISSUE at 15:13

## 2024-01-30 RX ADMIN — METHYLPREDNISOLONE ACETATE 80 MG: 40 INJECTION, SUSPENSION INTRA-ARTICULAR; INTRALESIONAL; INTRAMUSCULAR; SOFT TISSUE at 15:14

## 2024-01-30 NOTE — PROGRESS NOTES
Procedure Note    Patient Name: Peggy Ellis    Date of Procedure: January 30, 2024    Preoperative Diagnosis:     ICD-10-CM    1. Primary osteoarthritis of left knee  M17.12 methylPREDNISolone acetate (DEPO-MEDROL) injection 80 mg     DE ARTHROCENTESIS ASPIR&/INJ MAJOR JT/BURSA W/O US      2. Primary osteoarthritis of right knee  M17.11 methylPREDNISolone acetate (DEPO-MEDROL) injection 80 mg     DE ARTHROCENTESIS ASPIR&/INJ MAJOR JT/BURSA W/O US          Post Operative Diagnosis: same    Procedure: bilateral knees joint Injection    Consent: The patient was given the opportunity to ask questions regarding the procedure and its associated risks.     Procedure: The patient was placed in a sitting position on the exam table and both knees were prepped in the usual sterile manner. Each knee was anesthestized with 3 cc of xylocaine with a 25 gauge needle, the needle was left in place a total of 2cc of 40mg methylprednisolone and 5 cc of Lidocaine was slowly injected. The patient tolerated the procedure well. The injection areas were cleaned and Band-Aid applied. No excessive bleeding was noted. Patient dressed and was discharged to home with instructions.    Discussion:  The patient tolerated the procedure well.    Follow up: four months        JOHN Barrett  January 30, 2024

## 2024-03-10 NOTE — PROGRESS NOTES
[facility]  [address] [place] [address], [address]  [place] [address]  PHONE: [phone]        NAME:  Peggy Ellis  : 1950  MRN: [id number]     PCP:  Марина Lee MD      SUBJECTIVE:   Peggy Ellis is a 73 y.o. female seen for a follow up visit regarding the following:     Chief Complaint   Patient presents with    Hypertension    Results     Echo       HPI:    She presented recently to establish new cardiac care with a history of echocardiogram in  demonstrating moderate aortic stenosis but normal left ventricular function.  She carries a history of pulmonary hypertension as well.  Regardless she denies any symptoms of severe aortic stenosis and specifically denies angina, syncope, or CHF.  She has chronic lower extremity dependent edema which is worse at the end of the day, but wears lymphedema wraps and has minimal edema today.  She denies orthopnea or PND.  She has no prior coronary history.      She is wrapping her legs each day with the help of her .  She is encouraged to continue with lifestyle modification including diet, weight loss, leg elevation and leg wrapping as needed... She has been dieting and her weight is down 46 pounds from her peak.  She is encouraged to continue weight loss measures so that hopefully when her aortic stenosis becomes severe she will be a better candidate for TAVR.         The pathophysiology and progression of aortic stenosis was discussed with her today.  Aggressive weight loss measures need to be employed over the next year or two in order to improve her postoperative morbidity/mortality if and when it comes time to operate with SAVR/TAVR.      Echo 2022:  Left Ventricle Normal left ventricular systolic function with a visually estimated EF of 60 - 65%. Left ventricle size is normal. Mildly increased wall thickness. Findings consistent with concentric hypertrophy. Normal wall motion. Diastolic dysfunction present with

## 2024-03-12 ENCOUNTER — OFFICE VISIT (OUTPATIENT)
Age: 74
End: 2024-03-12
Payer: MEDICARE

## 2024-03-12 VITALS
SYSTOLIC BLOOD PRESSURE: 110 MMHG | WEIGHT: 268 LBS | BODY MASS INDEX: 49.32 KG/M2 | HEART RATE: 84 BPM | DIASTOLIC BLOOD PRESSURE: 58 MMHG | HEIGHT: 62 IN

## 2024-03-12 DIAGNOSIS — I35.0 NONRHEUMATIC AORTIC VALVE STENOSIS: Primary | ICD-10-CM

## 2024-03-12 DIAGNOSIS — I27.20 PULMONARY HTN (HCC): ICD-10-CM

## 2024-03-12 DIAGNOSIS — I10 PRIMARY HYPERTENSION: ICD-10-CM

## 2024-03-12 DIAGNOSIS — E11.65 TYPE 2 DIABETES MELLITUS WITH HYPERGLYCEMIA, WITHOUT LONG-TERM CURRENT USE OF INSULIN (HCC): ICD-10-CM

## 2024-03-12 PROCEDURE — 2022F DILAT RTA XM EVC RTNOPTHY: CPT | Performed by: INTERNAL MEDICINE

## 2024-03-12 PROCEDURE — 1036F TOBACCO NON-USER: CPT | Performed by: INTERNAL MEDICINE

## 2024-03-12 PROCEDURE — 3074F SYST BP LT 130 MM HG: CPT | Performed by: INTERNAL MEDICINE

## 2024-03-12 PROCEDURE — G8484 FLU IMMUNIZE NO ADMIN: HCPCS | Performed by: INTERNAL MEDICINE

## 2024-03-12 PROCEDURE — 3078F DIAST BP <80 MM HG: CPT | Performed by: INTERNAL MEDICINE

## 2024-03-12 PROCEDURE — 1090F PRES/ABSN URINE INCON ASSESS: CPT | Performed by: INTERNAL MEDICINE

## 2024-03-12 PROCEDURE — 3046F HEMOGLOBIN A1C LEVEL >9.0%: CPT | Performed by: INTERNAL MEDICINE

## 2024-03-12 PROCEDURE — 99214 OFFICE O/P EST MOD 30 MIN: CPT | Performed by: INTERNAL MEDICINE

## 2024-03-12 PROCEDURE — G8417 CALC BMI ABV UP PARAM F/U: HCPCS | Performed by: INTERNAL MEDICINE

## 2024-03-12 PROCEDURE — 1123F ACP DISCUSS/DSCN MKR DOCD: CPT | Performed by: INTERNAL MEDICINE

## 2024-03-12 PROCEDURE — 93000 ELECTROCARDIOGRAM COMPLETE: CPT | Performed by: INTERNAL MEDICINE

## 2024-03-12 PROCEDURE — G8427 DOCREV CUR MEDS BY ELIG CLIN: HCPCS | Performed by: INTERNAL MEDICINE

## 2024-03-12 PROCEDURE — 3017F COLORECTAL CA SCREEN DOC REV: CPT | Performed by: INTERNAL MEDICINE

## 2024-03-12 PROCEDURE — G8400 PT W/DXA NO RESULTS DOC: HCPCS | Performed by: INTERNAL MEDICINE

## 2024-05-01 ENCOUNTER — OFFICE VISIT (OUTPATIENT)
Dept: ORTHOPEDIC SURGERY | Age: 74
End: 2024-05-01
Payer: MEDICARE

## 2024-05-01 DIAGNOSIS — M17.11 PRIMARY OSTEOARTHRITIS OF RIGHT KNEE: ICD-10-CM

## 2024-05-01 DIAGNOSIS — M17.12 PRIMARY OSTEOARTHRITIS OF LEFT KNEE: Primary | ICD-10-CM

## 2024-05-01 PROCEDURE — 20610 DRAIN/INJ JOINT/BURSA W/O US: CPT | Performed by: PHYSICIAN ASSISTANT

## 2024-05-01 RX ORDER — METHYLPREDNISOLONE ACETATE 40 MG/ML
80 INJECTION, SUSPENSION INTRA-ARTICULAR; INTRALESIONAL; INTRAMUSCULAR; SOFT TISSUE ONCE
Status: COMPLETED | OUTPATIENT
Start: 2024-05-01 | End: 2024-05-01

## 2024-05-01 RX ADMIN — METHYLPREDNISOLONE ACETATE 80 MG: 40 INJECTION, SUSPENSION INTRA-ARTICULAR; INTRALESIONAL; INTRAMUSCULAR; SOFT TISSUE at 15:52

## 2024-05-01 RX ADMIN — METHYLPREDNISOLONE ACETATE 80 MG: 40 INJECTION, SUSPENSION INTRA-ARTICULAR; INTRALESIONAL; INTRAMUSCULAR; SOFT TISSUE at 15:53

## 2024-05-01 NOTE — PROGRESS NOTES
Procedure Note    Patient Name: Peggy Ellis    Date of Procedure: May 1, 2024    Preoperative Diagnosis:     ICD-10-CM    1. Primary osteoarthritis of left knee  M17.12 methylPREDNISolone acetate (DEPO-MEDROL) injection 80 mg     MT ARTHROCENTESIS ASPIR&/INJ MAJOR JT/BURSA W/O US      2. Primary osteoarthritis of right knee  M17.11 methylPREDNISolone acetate (DEPO-MEDROL) injection 80 mg     MT ARTHROCENTESIS ASPIR&/INJ MAJOR JT/BURSA W/O US          Post Operative Diagnosis: same    Procedure: bilateral knees joint Injection    Consent: The patient was given the opportunity to ask questions regarding the procedure and its associated risks.     Procedure: The patient was placed in a sitting position on the exam table and both knees were prepped in the usual sterile manner. Each knee was anesthestized with 3 cc of xylocaine with a 25 gauge needle, the needle was left in place a total of 2cc of 40mg methylprednisolone and 5 cc of Lidocaine was slowly injected. The patient tolerated the procedure well. The injection areas were cleaned and Band-Aid applied. No excessive bleeding was noted. Patient dressed and was discharged to home with instructions.    Discussion:  The patient tolerated the procedure well.    Follow up: 3 months        JOHN Barrett  May 1, 2024

## 2024-05-06 DIAGNOSIS — M17.12 PRIMARY OSTEOARTHRITIS OF LEFT KNEE: Primary | ICD-10-CM

## 2024-05-06 DIAGNOSIS — M17.11 PRIMARY OSTEOARTHRITIS OF RIGHT KNEE: ICD-10-CM

## 2024-07-02 ENCOUNTER — TELEPHONE (OUTPATIENT)
Dept: FAMILY MEDICINE CLINIC | Facility: CLINIC | Age: 74
End: 2024-07-02

## 2024-07-02 DIAGNOSIS — M17.10 ARTHRITIS OF KNEE: ICD-10-CM

## 2024-07-02 DIAGNOSIS — E11.65 TYPE 2 DIABETES MELLITUS WITH HYPERGLYCEMIA, WITHOUT LONG-TERM CURRENT USE OF INSULIN (HCC): ICD-10-CM

## 2024-07-02 DIAGNOSIS — Z00.00 MEDICARE ANNUAL WELLNESS VISIT, SUBSEQUENT: ICD-10-CM

## 2024-07-02 DIAGNOSIS — I50.9 CONGESTIVE HEART FAILURE, UNSPECIFIED HF CHRONICITY, UNSPECIFIED HEART FAILURE TYPE (HCC): ICD-10-CM

## 2024-07-02 DIAGNOSIS — I10 PRIMARY HYPERTENSION: Primary | ICD-10-CM

## 2024-07-03 ENCOUNTER — NURSE ONLY (OUTPATIENT)
Dept: FAMILY MEDICINE CLINIC | Facility: CLINIC | Age: 74
End: 2024-07-03

## 2024-07-03 DIAGNOSIS — I50.9 CONGESTIVE HEART FAILURE, UNSPECIFIED HF CHRONICITY, UNSPECIFIED HEART FAILURE TYPE (HCC): ICD-10-CM

## 2024-07-03 DIAGNOSIS — E11.65 TYPE 2 DIABETES MELLITUS WITH HYPERGLYCEMIA, WITHOUT LONG-TERM CURRENT USE OF INSULIN (HCC): ICD-10-CM

## 2024-07-03 DIAGNOSIS — I10 PRIMARY HYPERTENSION: Primary | ICD-10-CM

## 2024-07-03 DIAGNOSIS — Z00.00 MEDICARE ANNUAL WELLNESS VISIT, SUBSEQUENT: ICD-10-CM

## 2024-07-03 LAB
ALBUMIN SERPL-MCNC: 4.4 G/DL (ref 3.2–4.6)
ALBUMIN/GLOB SERPL: 1.4 (ref 1–1.9)
ALP SERPL-CCNC: 93 U/L (ref 35–104)
ALT SERPL-CCNC: 18 U/L (ref 12–65)
ANION GAP SERPL CALC-SCNC: 12 MMOL/L (ref 9–18)
AST SERPL-CCNC: 22 U/L (ref 15–37)
BASOPHILS # BLD: 0 K/UL (ref 0–0.2)
BASOPHILS NFR BLD: 0 % (ref 0–2)
BILIRUB SERPL-MCNC: 0.3 MG/DL (ref 0–1.2)
BUN SERPL-MCNC: 13 MG/DL (ref 8–23)
CALCIUM SERPL-MCNC: 10.3 MG/DL (ref 8.8–10.2)
CHLORIDE SERPL-SCNC: 101 MMOL/L (ref 98–107)
CHOLEST SERPL-MCNC: 169 MG/DL (ref 0–200)
CO2 SERPL-SCNC: 26 MMOL/L (ref 20–28)
CREAT SERPL-MCNC: 0.55 MG/DL (ref 0.6–1.1)
CREAT UR-MCNC: 88.3 MG/DL (ref 28–217)
DIFFERENTIAL METHOD BLD: NORMAL
EOSINOPHIL # BLD: 0.1 K/UL (ref 0–0.8)
EOSINOPHIL NFR BLD: 1 % (ref 0.5–7.8)
ERYTHROCYTE [DISTWIDTH] IN BLOOD BY AUTOMATED COUNT: 12.9 % (ref 11.9–14.6)
EST. AVERAGE GLUCOSE BLD GHB EST-MCNC: 143 MG/DL
GLOBULIN SER CALC-MCNC: 3.1 G/DL (ref 2.3–3.5)
GLUCOSE SERPL-MCNC: 126 MG/DL (ref 70–99)
HBA1C MFR BLD: 6.6 % (ref 0–5.6)
HCT VFR BLD AUTO: 42.8 % (ref 35.8–46.3)
HDLC SERPL-MCNC: 58 MG/DL (ref 40–60)
HDLC SERPL: 2.9 (ref 0–5)
HGB BLD-MCNC: 13.8 G/DL (ref 11.7–15.4)
IMM GRANULOCYTES # BLD AUTO: 0 K/UL (ref 0–0.5)
IMM GRANULOCYTES NFR BLD AUTO: 0 % (ref 0–5)
LDLC SERPL CALC-MCNC: 90 MG/DL (ref 0–100)
LYMPHOCYTES # BLD: 2.4 K/UL (ref 0.5–4.6)
LYMPHOCYTES NFR BLD: 29 % (ref 13–44)
MCH RBC QN AUTO: 30.5 PG (ref 26.1–32.9)
MCHC RBC AUTO-ENTMCNC: 32.2 G/DL (ref 31.4–35)
MCV RBC AUTO: 94.5 FL (ref 82–102)
MICROALBUMIN UR-MCNC: <1.2 MG/DL (ref 0–20)
MICROALBUMIN/CREAT UR-RTO: NORMAL MG/G (ref 0–30)
MONOCYTES # BLD: 0.7 K/UL (ref 0.1–1.3)
MONOCYTES NFR BLD: 8 % (ref 4–12)
NEUTS SEG # BLD: 5.3 K/UL (ref 1.7–8.2)
NEUTS SEG NFR BLD: 62 % (ref 43–78)
NRBC # BLD: 0 K/UL (ref 0–0.2)
PLATELET # BLD AUTO: 283 K/UL (ref 150–450)
PMV BLD AUTO: 10.5 FL (ref 9.4–12.3)
POTASSIUM SERPL-SCNC: 5.1 MMOL/L (ref 3.5–5.1)
PROT SERPL-MCNC: 7.5 G/DL (ref 6.3–8.2)
RBC # BLD AUTO: 4.53 M/UL (ref 4.05–5.2)
SODIUM SERPL-SCNC: 139 MMOL/L (ref 136–145)
TRIGL SERPL-MCNC: 104 MG/DL (ref 0–150)
VLDLC SERPL CALC-MCNC: 21 MG/DL (ref 6–23)
WBC # BLD AUTO: 8.5 K/UL (ref 4.3–11.1)

## 2024-07-08 SDOH — ECONOMIC STABILITY: FOOD INSECURITY: WITHIN THE PAST 12 MONTHS, THE FOOD YOU BOUGHT JUST DIDN'T LAST AND YOU DIDN'T HAVE MONEY TO GET MORE.: PATIENT DECLINED

## 2024-07-08 SDOH — ECONOMIC STABILITY: INCOME INSECURITY: HOW HARD IS IT FOR YOU TO PAY FOR THE VERY BASICS LIKE FOOD, HOUSING, MEDICAL CARE, AND HEATING?: PATIENT DECLINED

## 2024-07-08 SDOH — ECONOMIC STABILITY: HOUSING INSECURITY
IN THE LAST 12 MONTHS, WAS THERE A TIME WHEN YOU DID NOT HAVE A STEADY PLACE TO SLEEP OR SLEPT IN A SHELTER (INCLUDING NOW)?: PATIENT DECLINED

## 2024-07-08 SDOH — ECONOMIC STABILITY: FOOD INSECURITY: WITHIN THE PAST 12 MONTHS, YOU WORRIED THAT YOUR FOOD WOULD RUN OUT BEFORE YOU GOT MONEY TO BUY MORE.: PATIENT DECLINED

## 2024-07-08 ASSESSMENT — PATIENT HEALTH QUESTIONNAIRE - PHQ9
1. LITTLE INTEREST OR PLEASURE IN DOING THINGS: NOT AT ALL
SUM OF ALL RESPONSES TO PHQ QUESTIONS 1-9: 0
2. FEELING DOWN, DEPRESSED OR HOPELESS: NOT AT ALL
SUM OF ALL RESPONSES TO PHQ QUESTIONS 1-9: 0
SUM OF ALL RESPONSES TO PHQ9 QUESTIONS 1 & 2: 0

## 2024-07-08 ASSESSMENT — LIFESTYLE VARIABLES
HOW OFTEN DO YOU HAVE A DRINK CONTAINING ALCOHOL: 1
HOW OFTEN DO YOU HAVE A DRINK CONTAINING ALCOHOL: NEVER
HOW MANY STANDARD DRINKS CONTAINING ALCOHOL DO YOU HAVE ON A TYPICAL DAY: 0
HOW OFTEN DO YOU HAVE SIX OR MORE DRINKS ON ONE OCCASION: 1
HOW MANY STANDARD DRINKS CONTAINING ALCOHOL DO YOU HAVE ON A TYPICAL DAY: PATIENT DOES NOT DRINK

## 2024-07-10 ENCOUNTER — OFFICE VISIT (OUTPATIENT)
Dept: FAMILY MEDICINE CLINIC | Facility: CLINIC | Age: 74
End: 2024-07-10
Payer: MEDICARE

## 2024-07-10 VITALS
RESPIRATION RATE: 16 BRPM | HEART RATE: 74 BPM | DIASTOLIC BLOOD PRESSURE: 82 MMHG | WEIGHT: 263 LBS | TEMPERATURE: 96.4 F | OXYGEN SATURATION: 97 % | SYSTOLIC BLOOD PRESSURE: 162 MMHG | HEIGHT: 62 IN | BODY MASS INDEX: 48.4 KG/M2

## 2024-07-10 DIAGNOSIS — I35.0 NONRHEUMATIC AORTIC VALVE STENOSIS: ICD-10-CM

## 2024-07-10 DIAGNOSIS — M17.10 ARTHRITIS OF KNEE: ICD-10-CM

## 2024-07-10 DIAGNOSIS — I10 PRIMARY HYPERTENSION: ICD-10-CM

## 2024-07-10 DIAGNOSIS — G43.009 MIGRAINE WITHOUT AURA AND WITHOUT STATUS MIGRAINOSUS, NOT INTRACTABLE: ICD-10-CM

## 2024-07-10 DIAGNOSIS — I27.20 PULMONARY HTN (HCC): ICD-10-CM

## 2024-07-10 DIAGNOSIS — E11.65 TYPE 2 DIABETES MELLITUS WITH HYPERGLYCEMIA, WITHOUT LONG-TERM CURRENT USE OF INSULIN (HCC): ICD-10-CM

## 2024-07-10 DIAGNOSIS — I50.9 CONGESTIVE HEART FAILURE, UNSPECIFIED HF CHRONICITY, UNSPECIFIED HEART FAILURE TYPE (HCC): ICD-10-CM

## 2024-07-10 DIAGNOSIS — Z00.00 MEDICARE ANNUAL WELLNESS VISIT, SUBSEQUENT: Primary | ICD-10-CM

## 2024-07-10 PROCEDURE — G8417 CALC BMI ABV UP PARAM F/U: HCPCS | Performed by: FAMILY MEDICINE

## 2024-07-10 PROCEDURE — 3077F SYST BP >= 140 MM HG: CPT | Performed by: FAMILY MEDICINE

## 2024-07-10 PROCEDURE — G8400 PT W/DXA NO RESULTS DOC: HCPCS | Performed by: FAMILY MEDICINE

## 2024-07-10 PROCEDURE — 3044F HG A1C LEVEL LT 7.0%: CPT | Performed by: FAMILY MEDICINE

## 2024-07-10 PROCEDURE — G0439 PPPS, SUBSEQ VISIT: HCPCS | Performed by: FAMILY MEDICINE

## 2024-07-10 PROCEDURE — 3079F DIAST BP 80-89 MM HG: CPT | Performed by: FAMILY MEDICINE

## 2024-07-10 PROCEDURE — 2022F DILAT RTA XM EVC RTNOPTHY: CPT | Performed by: FAMILY MEDICINE

## 2024-07-10 PROCEDURE — 1090F PRES/ABSN URINE INCON ASSESS: CPT | Performed by: FAMILY MEDICINE

## 2024-07-10 PROCEDURE — 1123F ACP DISCUSS/DSCN MKR DOCD: CPT | Performed by: FAMILY MEDICINE

## 2024-07-10 PROCEDURE — 99214 OFFICE O/P EST MOD 30 MIN: CPT | Performed by: FAMILY MEDICINE

## 2024-07-10 PROCEDURE — G8427 DOCREV CUR MEDS BY ELIG CLIN: HCPCS | Performed by: FAMILY MEDICINE

## 2024-07-10 PROCEDURE — 3017F COLORECTAL CA SCREEN DOC REV: CPT | Performed by: FAMILY MEDICINE

## 2024-07-10 PROCEDURE — 1036F TOBACCO NON-USER: CPT | Performed by: FAMILY MEDICINE

## 2024-07-10 NOTE — PROGRESS NOTES
Medicare Annual Wellness Visit    Peggy Ellis is here for Medicare AWV    Assessment & Plan   Medicare annual wellness visit, subsequent  Pulmonary HTN (HCC)  Primary hypertension  Congestive heart failure, unspecified HF chronicity, unspecified heart failure type (HCC)  Nonrheumatic aortic valve stenosis  Arthritis of knee  Type 2 diabetes mellitus with hyperglycemia, without long-term current use of insulin (HCC)  Migraine without aura and without status migrainosus, not intractable  Body mass index (BMI) 45.0-49.9, adult ([facility])  Recommendations for Preventive Services Due: see orders and patient instructions/AVS.  Recommended screening schedule for the next 5-10 years is provided to the patient in written form: see Patient Instructions/AVS.     Return in about 6 months (around 1/10/2025).     Subjective   The following acute and/or chronic problems were also addressed today:  She also  comes in to check on diabetes, hypertension, and high cholesterol. She does not check her sugars.Sugars have been running unknown. She controls it with diet. Last eye exam was this year.  Feet have no problems as far as neuropathy but she does have trouble with swelling. Does wear support stocking regularly and it has helped and no skin breakdown.Did not have a flu shot this year -does not want one. Did not have a pneumonia shot -does not want one not. Did have a tetanus shot tdap 2/7/13.Does not want immunizations. Has not been working on diet and exercise. Blood pressure has been under control at home.          She does have a history of congestive heart failure, hypertension, pulmonary hypertension, diabetes, and morbid obesity as well as knee arthritis, end-stage, but they do not want to do surgery because of her heart. She has to walk with a walker.        She does see cardiology about her aortic valve stenosis and everything has been going well.      We did go over her blood work today and it was excellent.  Continue her

## 2024-08-02 NOTE — PROGRESS NOTES
Previous interventions:  Procedure Date Results   Bilateral Knee Steroid Injection  5/1/2024, 1/30/2024, 8/31/2023                                    Previous medication trials: Listed:  Class Medication Results   NSAIDs ***    Oral steroids     Tylenol     Antiepileptics     Antidepressants     Relaxants     Topicals/transdermaI patches Voltaren Gel    Narcotics       Previous tests/studies:  Study Date Results   XR BILATERAL KNEE 8/31/2023 AP, lateral, sunrise, and 45 degree  views of the bilateral knees are reviewed.    There is joint space loss, eburnated bone, and osteophyte formation present.  X-ray impression:  Advanced degenerative joint disease of bilateral  knee                              Previous therapies:  Type of Therapy Date Results   ***

## 2024-08-07 ENCOUNTER — OFFICE VISIT (OUTPATIENT)
Dept: ORTHOPEDIC SURGERY | Age: 74
End: 2024-08-07
Payer: MEDICARE

## 2024-08-07 DIAGNOSIS — M17.12 PRIMARY OSTEOARTHRITIS OF LEFT KNEE: Primary | ICD-10-CM

## 2024-08-07 DIAGNOSIS — M17.11 PRIMARY OSTEOARTHRITIS OF RIGHT KNEE: ICD-10-CM

## 2024-08-07 DIAGNOSIS — R60.0 EDEMA OF BOTH LOWER EXTREMITIES: ICD-10-CM

## 2024-08-07 PROCEDURE — 1123F ACP DISCUSS/DSCN MKR DOCD: CPT | Performed by: PHYSICIAN ASSISTANT

## 2024-08-07 PROCEDURE — G8417 CALC BMI ABV UP PARAM F/U: HCPCS | Performed by: PHYSICIAN ASSISTANT

## 2024-08-07 PROCEDURE — G8427 DOCREV CUR MEDS BY ELIG CLIN: HCPCS | Performed by: PHYSICIAN ASSISTANT

## 2024-08-07 PROCEDURE — 1036F TOBACCO NON-USER: CPT | Performed by: PHYSICIAN ASSISTANT

## 2024-08-07 PROCEDURE — 20610 DRAIN/INJ JOINT/BURSA W/O US: CPT | Performed by: PHYSICIAN ASSISTANT

## 2024-08-07 PROCEDURE — 1090F PRES/ABSN URINE INCON ASSESS: CPT | Performed by: PHYSICIAN ASSISTANT

## 2024-08-07 PROCEDURE — 99213 OFFICE O/P EST LOW 20 MIN: CPT | Performed by: PHYSICIAN ASSISTANT

## 2024-08-07 PROCEDURE — 3017F COLORECTAL CA SCREEN DOC REV: CPT | Performed by: PHYSICIAN ASSISTANT

## 2024-08-07 PROCEDURE — G8400 PT W/DXA NO RESULTS DOC: HCPCS | Performed by: PHYSICIAN ASSISTANT

## 2024-08-07 RX ORDER — METHYLPREDNISOLONE ACETATE 40 MG/ML
80 INJECTION, SUSPENSION INTRA-ARTICULAR; INTRALESIONAL; INTRAMUSCULAR; SOFT TISSUE ONCE
Status: COMPLETED | OUTPATIENT
Start: 2024-08-07 | End: 2024-08-07

## 2024-08-07 RX ADMIN — METHYLPREDNISOLONE ACETATE 80 MG: 40 INJECTION, SUSPENSION INTRA-ARTICULAR; INTRALESIONAL; INTRAMUSCULAR; SOFT TISSUE at 14:08

## 2024-08-07 RX ADMIN — METHYLPREDNISOLONE ACETATE 80 MG: 40 INJECTION, SUSPENSION INTRA-ARTICULAR; INTRALESIONAL; INTRAMUSCULAR; SOFT TISSUE at 14:07

## 2024-08-07 NOTE — PROGRESS NOTES
Name: Peggy Ellis  YOB: 1950  Gender: female  MRN: [id number]    CC: No chief complaint on file.      HPI:  Patient returns today for osteoarthritis of bilateral  knee. We have been treating this conservatively with modification of activities, knee exercise program and periodic cortisone injections. They have noted improvement in symptoms of knee pain with cortisone. It has been approximately 4 months since the last injection(s). They would like to have reinjection of bilateral  knee(s) as it did provide good relief and they are beginning to have recurrence of their symptoms.      ROS:  As per HPI. Pertinent postives and negatives are addressed with the patient, particularly those related to musculoskeletal concerns.  Non-orthopaedic concerns were referred back to the primary care physician.      Physical exam:  Patient is pleasant and in no acute distress  No obvious bony deformity to the knee(s).  Good range of motion of knee(s)  No skin discoloration  Neurovascularly intact      Impression:    Diagnosis Orders   1. Primary osteoarthritis of left knee        2. Primary osteoarthritis of right knee             Procedure Note    Patient Name: Peggy Ellis    Date of Procedure: August 7, 2024    Preoperative Diagnosis:     ICD-10-CM    1. Primary osteoarthritis of left knee  M17.12       2. Primary osteoarthritis of right knee  M17.11           Post Operative Diagnosis: same    Procedure: bilateral knees joint Injection    Consent: The patient was given the opportunity to ask questions regarding the procedure and its associated risks.     Procedure: The patient was placed in a sitting position on the exam table and both knees were prepped in the usual sterile manner. Each knee was anesthestized with 3 cc of xylocaine with a 25 gauge needle, the needle was left in place a total of 2cc of 40mg methylprednisolone and 5 cc of Lidocaine was slowly injected. The patient tolerated the procedure well. The

## 2024-08-13 ENCOUNTER — OFFICE VISIT (OUTPATIENT)
Age: 74
End: 2024-08-13
Payer: MEDICARE

## 2024-08-13 DIAGNOSIS — M25.562 BILATERAL CHRONIC KNEE PAIN: Primary | ICD-10-CM

## 2024-08-13 DIAGNOSIS — M25.561 BILATERAL CHRONIC KNEE PAIN: Primary | ICD-10-CM

## 2024-08-13 DIAGNOSIS — G89.29 BILATERAL CHRONIC KNEE PAIN: Primary | ICD-10-CM

## 2024-08-13 PROCEDURE — 1123F ACP DISCUSS/DSCN MKR DOCD: CPT | Performed by: ANESTHESIOLOGY

## 2024-08-13 PROCEDURE — 99204 OFFICE O/P NEW MOD 45 MIN: CPT | Performed by: ANESTHESIOLOGY

## 2024-08-13 NOTE — PROGRESS NOTES
Chronic Pain Consult Note      Plan:     A comprehensive pain management plan may consist of the following: Testing, Therapy, Medications, Interventions, Consults, and Follow up.    Chronic bilateral knee pain  Scheduled for bilateral viscosupplementation injections (series of 3)  Encourage continued home exercises  If patient fails to respond to viscosupplementation injections did briefly discussed genicular nerve blocks/RFA  Bilateral knee primary osteoarthritis  Anatomically patient is a candidate for knee replacements however due to comorbidities including cardiovascular compromise and weight patient is not a candidate.    General Recommendations: The pain condition that the patient suffers from is best treated with a multidisciplinary approach that involves an increase in physical activity to prevent de-conditioning and worsening of the pain cycle, as well as psychological counseling (formal and/or informal) to address the co morbid psychological effects of pain. Treatment will often involve judicious use of pain medications and interventional procedures to decrease the pain, allowing the patient to participate in the physical activity that will ultimately produce long-lasting pain reductions. The goal of the multidisciplinary approach is to return the patient to a higher level of overall function and to restore their ability to perform activities of daily living.      Referring Provider: Dacia Rios PA  Assessment:      Chief Complaint: No chief complaint on file.      Peggy Ellis is a 74 y.o. female being [facility] for the following diagnoses:    Diagnosis:  No diagnosis found.      Subjective:      HPI:  Ms. Ellis is seen in consultation at the request of Dacia Rios PA for evaluation and recommendations regarding the above diagnoses and the below HPI.    HPI on08/13/24: 74-year-old female presents for evaluation treatment of bilateral knee pain: Patient reports

## 2024-08-22 NOTE — PROGRESS NOTES
Procedure Date: 2024   Location: [facility]       Procedure: Bilateral Knee Synvisc Series #1       Time Out performed prior to start of the procedure:       Krystal Boston PA-C, performed the following reviews on [facility] 1950 prior to the start of the procedure:       patient was identified by name and     agreement on procedure being performed was verified   risks and benefits explained to patient by the provider  procedure site verified as Bilateral  patient was positioned for comfort   consent signed and verified for procedure       Time:  10:15 AM        Procedure performed by:   Krystal Boston PA-C       Patient assisted by:   ZOE NORTON MA

## 2024-08-23 ENCOUNTER — NURSE ONLY (OUTPATIENT)
Age: 74
End: 2024-08-23

## 2024-08-23 DIAGNOSIS — M25.561 BILATERAL CHRONIC KNEE PAIN: Primary | ICD-10-CM

## 2024-08-23 DIAGNOSIS — M17.0 BILATERAL PRIMARY OSTEOARTHRITIS OF KNEE: ICD-10-CM

## 2024-08-23 DIAGNOSIS — M25.562 BILATERAL CHRONIC KNEE PAIN: Primary | ICD-10-CM

## 2024-08-23 DIAGNOSIS — G89.29 BILATERAL CHRONIC KNEE PAIN: Primary | ICD-10-CM

## 2024-08-23 NOTE — PROGRESS NOTES
Joint Aspiration/Injection    Date/Time: 8/23/2024 9:55 AM    Performed by: Krystal Boston PA  Authorized by: Krystal Boston PA    Consent:     Consent obtained:  Written    Consent given by:  Patient    Risks, benefits, and alternatives were discussed: yes      Risks discussed:  Infection, pain and bleeding    Alternatives discussed:  Delayed treatment, no treatment, alternative treatment and observation  Universal protocol:     Procedure explained and questions answered to patient or proxy's satisfaction: yes      Relevant documents present and verified: yes      Imaging studies available: yes      Site/side marked: yes      Immediately prior to procedure, a time out was called: yes      Patient identity confirmed:  Verbally with patient  Location:     Location:  Knee    Knee joint: BILATERAL.  Procedure details:     Preparation: Patient was prepped and draped in usual sterile fashion      Needle gauge:  22 G    Ultrasound guidance: no      Approach:  Lateral    Aspirate amount:  0    Steroid injected: no (Synvisc x 2)      Specimen collected: no    Post-procedure details:     Dressing:  Adhesive bandage    Procedure completion:  Tolerated well, no immediate complications      Bilateral  Knee Injection    The patient presented for Synvisc injection   Indications: Bilateral Knee Osteoarthritis.   Consent: Consent was obtained after explaining the risks and benefits of the procedure. The patient is not currently ill or allergic to any materials to be used in the procedure. Prep Patient is is afebrile. No signs of infection on knee. The skin was prepped with Chloraprep. Timeout Performed for correct injection site.  Identification and confirmation were performed using written consent and history and physical exam.  Procedure 2 ml SYNVISC ( series 1/3 ) was injected to Bilateral knee after negative aspiration. Complications None noted . Post-Procedure The patient tolerated the procedure well.

## 2024-09-03 ENCOUNTER — NURSE ONLY (OUTPATIENT)
Age: 74
End: 2024-09-03
Payer: MEDICARE

## 2024-09-03 DIAGNOSIS — M25.561 BILATERAL CHRONIC KNEE PAIN: ICD-10-CM

## 2024-09-03 DIAGNOSIS — M17.0 BILATERAL PRIMARY OSTEOARTHRITIS OF KNEE: Primary | ICD-10-CM

## 2024-09-03 DIAGNOSIS — M25.562 BILATERAL CHRONIC KNEE PAIN: ICD-10-CM

## 2024-09-03 DIAGNOSIS — G89.29 BILATERAL CHRONIC KNEE PAIN: ICD-10-CM

## 2024-09-03 PROCEDURE — 20610 DRAIN/INJ JOINT/BURSA W/O US: CPT | Performed by: PHYSICIAN ASSISTANT

## 2024-09-03 NOTE — PROGRESS NOTES
Joint Aspiration/Injection    Date/Time: 9/3/2024 2:30 PM    Performed by: Krystal Boston PA  Authorized by: Krystal Boston PA    Consent:     Consent obtained:  Written    Consent given by:  Patient    Risks, benefits, and alternatives were discussed: yes      Risks discussed:  Infection, bleeding and pain    Alternatives discussed:  Observation, alternative treatment and delayed treatment  Universal protocol:     Procedure explained and questions answered to patient or proxy's satisfaction: yes      Relevant documents present and verified: yes      Imaging studies available: yes      Site/side marked: yes      Immediately prior to procedure, a time out was called: yes      Patient identity confirmed:  Verbally with patient  Location:     Location:  Knee    Knee joint: BILATERAL.  Procedure details:     Needle gauge:  22 G    Ultrasound guidance: no      Approach:  Lateral    Intraarticular injection: SYNVISC #2.      Specimen collected: no    Post-procedure details:     Dressing:  Adhesive bandage    Procedure completion:  Tolerated well, no immediate complications    Bilateral  Knee Injection    The patient presented for SYNVISC #2 injection   Indications: Bilateral Knee Pain.   Consent: Consent was obtained after explaining the risks and benefits of the procedure. The patient is not currently ill or allergic to any materials to be used in the procedure. Prep Patient is is afebrile. No signs of infection on knee. The skin was prepped with Chloraprep. Timeout Performed for correct injection site.  Identification and confirmation were performed using written consent and history and physical exam.  Procedure 2 ml SYNVISC ( series 2/3 ) was injected to Bilateral knee after negative aspiration. Complications None noted . Post-Procedure The patient tolerated the procedure well. Post procedure instructions were explained and given to the patient.  The patient was discharged home in stable

## 2024-09-03 NOTE — PROGRESS NOTES
Procedure Date: September 3, 2024      Location: [facility]       Procedure: BILATERAL KNEE SYNVISC SERIES #1       Time Out performed prior to start of the procedure:       Krystal Boston PA-C  performed the following reviews on [facility] 1950 prior to the start of the procedure:       patient was identified by name and     agreement on procedure being performed was verified   risks and benefits explained to patient by the provider  procedure site verified as Bilateral  patient was positioned for comfort   consent signed and verified for procedure       Time:  1:45 PM        Procedure performed by:   Krystal Boston PA-C      Patient assisted by:   ZOE NORTON MA

## 2024-09-16 ENCOUNTER — NURSE ONLY (OUTPATIENT)
Age: 74
End: 2024-09-16
Payer: MEDICARE

## 2024-09-16 DIAGNOSIS — M17.0 BILATERAL PRIMARY OSTEOARTHRITIS OF KNEE: Primary | ICD-10-CM

## 2024-09-16 PROCEDURE — 20610 DRAIN/INJ JOINT/BURSA W/O US: CPT | Performed by: PHYSICIAN ASSISTANT

## 2024-09-17 ENCOUNTER — OFFICE VISIT (OUTPATIENT)
Age: 74
End: 2024-09-17
Payer: MEDICARE

## 2024-09-17 VITALS
HEART RATE: 78 BPM | DIASTOLIC BLOOD PRESSURE: 75 MMHG | HEIGHT: 62 IN | BODY MASS INDEX: 47.66 KG/M2 | WEIGHT: 259 LBS | SYSTOLIC BLOOD PRESSURE: 139 MMHG

## 2024-09-17 DIAGNOSIS — I27.20 PULMONARY HTN (HCC): ICD-10-CM

## 2024-09-17 DIAGNOSIS — E11.65 TYPE 2 DIABETES MELLITUS WITH HYPERGLYCEMIA, WITHOUT LONG-TERM CURRENT USE OF INSULIN (HCC): ICD-10-CM

## 2024-09-17 DIAGNOSIS — I35.0 NONRHEUMATIC AORTIC VALVE STENOSIS: Primary | ICD-10-CM

## 2024-09-17 DIAGNOSIS — I10 PRIMARY HYPERTENSION: ICD-10-CM

## 2024-09-17 PROCEDURE — G8427 DOCREV CUR MEDS BY ELIG CLIN: HCPCS | Performed by: INTERNAL MEDICINE

## 2024-09-17 PROCEDURE — 3078F DIAST BP <80 MM HG: CPT | Performed by: INTERNAL MEDICINE

## 2024-09-17 PROCEDURE — 3044F HG A1C LEVEL LT 7.0%: CPT | Performed by: INTERNAL MEDICINE

## 2024-09-17 PROCEDURE — G8400 PT W/DXA NO RESULTS DOC: HCPCS | Performed by: INTERNAL MEDICINE

## 2024-09-17 PROCEDURE — 2022F DILAT RTA XM EVC RTNOPTHY: CPT | Performed by: INTERNAL MEDICINE

## 2024-09-17 PROCEDURE — 1090F PRES/ABSN URINE INCON ASSESS: CPT | Performed by: INTERNAL MEDICINE

## 2024-09-17 PROCEDURE — 3017F COLORECTAL CA SCREEN DOC REV: CPT | Performed by: INTERNAL MEDICINE

## 2024-09-17 PROCEDURE — G8417 CALC BMI ABV UP PARAM F/U: HCPCS | Performed by: INTERNAL MEDICINE

## 2024-09-17 PROCEDURE — 1123F ACP DISCUSS/DSCN MKR DOCD: CPT | Performed by: INTERNAL MEDICINE

## 2024-09-17 PROCEDURE — 99214 OFFICE O/P EST MOD 30 MIN: CPT | Performed by: INTERNAL MEDICINE

## 2024-09-17 PROCEDURE — 3075F SYST BP GE 130 - 139MM HG: CPT | Performed by: INTERNAL MEDICINE

## 2024-09-17 PROCEDURE — 1036F TOBACCO NON-USER: CPT | Performed by: INTERNAL MEDICINE

## 2024-10-08 ENCOUNTER — OFFICE VISIT (OUTPATIENT)
Age: 74
End: 2024-10-08
Payer: MEDICARE

## 2024-10-08 DIAGNOSIS — M25.562 BILATERAL CHRONIC KNEE PAIN: ICD-10-CM

## 2024-10-08 DIAGNOSIS — M17.0 BILATERAL PRIMARY OSTEOARTHRITIS OF KNEE: Primary | ICD-10-CM

## 2024-10-08 DIAGNOSIS — M25.561 BILATERAL CHRONIC KNEE PAIN: ICD-10-CM

## 2024-10-08 DIAGNOSIS — G89.29 BILATERAL CHRONIC KNEE PAIN: ICD-10-CM

## 2024-10-08 PROCEDURE — 99214 OFFICE O/P EST MOD 30 MIN: CPT | Performed by: PHYSICIAN ASSISTANT

## 2024-10-08 PROCEDURE — 1123F ACP DISCUSS/DSCN MKR DOCD: CPT | Performed by: PHYSICIAN ASSISTANT

## 2024-10-08 RX ORDER — MELOXICAM 15 MG/1
15 TABLET ORAL DAILY PRN
Qty: 30 TABLET | Refills: 2 | Status: SHIPPED | OUTPATIENT
Start: 2024-10-08 | End: 2025-01-06

## 2024-10-08 ASSESSMENT — ENCOUNTER SYMPTOMS
SHORTNESS OF BREATH: 0
EYES NEGATIVE: 1
ABDOMINAL PAIN: 0
ALLERGIC/IMMUNOLOGIC NEGATIVE: 1

## 2024-10-08 NOTE — PROGRESS NOTES
Ms Ellis is a 3 week f/u for Synvisc series which was performed bilaterally on her knees. She reports ***. She was originally referred ot us for consideration of genicular blocks followed by RFA but her insurance unfortunately considers these to be 'investigational' procedures.   
cirrhosis - nondrinker    Liver Disease Paternal Aunt         cirrhosis - nondrinker    Hypertension Mother     Parkinsonism Father     Heart Disease Mother     Heart Disease Paternal Grandfather     Lung Disease Father         copd           Social History     Socioeconomic History    Marital status:      Spouse name: Not on file    Number of children: Not on file    Years of education: Not on file    Highest education level: Not on file   Occupational History    Not on file   Tobacco Use    Smoking status: Never    Smokeless tobacco: Never   Substance and Sexual Activity    Alcohol use: No    Drug use: No    Sexual activity: Not on file   Other Topics Concern    Not on file   Social History Narrative    Not on file     Social Determinants of Health     Financial Resource Strain: Patient Declined (7/8/2024)    Overall Financial Resource Strain (CARDIA)     Difficulty of Paying Living Expenses: Patient declined   Food Insecurity: Patient Declined (7/8/2024)    Hunger Vital Sign     Worried About Running Out of Food in the Last Year: Patient declined     Ran Out of Food in the Last Year: Patient declined   Transportation Needs: Unknown (7/8/2024)    PRAPARE - Transportation     Lack of Transportation (Medical): Not on file     Lack of Transportation (Non-Medical): Patient declined   Physical Activity: Unknown (7/8/2024)    Exercise Vital Sign     Days of Exercise per Week: Patient declined     Minutes of Exercise per Session: Not on file   Stress: Not on file   Social Connections: Unknown (10/29/2021)    Received from [employer]    Social Connections     Frequency of Communication with Friends and Family: Not asked     Frequency of Social Gatherings with Friends and Family: Not asked   Intimate Partner Violence: Unknown (10/29/2021)    Received from [employer]    Intimate Partner Violence     Fear of Current or Ex-Partner: Not asked     Emotionally Abused: Not asked

## 2024-12-29 DIAGNOSIS — I10 PRIMARY HYPERTENSION: ICD-10-CM

## 2024-12-29 DIAGNOSIS — I50.9 CONGESTIVE HEART FAILURE, UNSPECIFIED HF CHRONICITY, UNSPECIFIED HEART FAILURE TYPE (HCC): ICD-10-CM

## 2024-12-29 RX ORDER — RAMIPRIL 10 MG/1
CAPSULE ORAL
Qty: 90 CAPSULE | Refills: 3 | Status: SHIPPED | OUTPATIENT
Start: 2024-12-29

## 2024-12-29 RX ORDER — AMLODIPINE BESYLATE 5 MG/1
5 TABLET ORAL DAILY
Qty: 90 TABLET | Refills: 3 | Status: SHIPPED | OUTPATIENT
Start: 2024-12-29

## 2024-12-29 RX ORDER — POTASSIUM CHLORIDE 750 MG/1
10 TABLET, EXTENDED RELEASE ORAL DAILY
Qty: 90 TABLET | Refills: 3 | Status: SHIPPED | OUTPATIENT
Start: 2024-12-29

## 2024-12-29 RX ORDER — FUROSEMIDE 40 MG/1
TABLET ORAL
Qty: 135 TABLET | Refills: 3 | Status: SHIPPED | OUTPATIENT
Start: 2024-12-29

## 2024-12-29 RX ORDER — METOPROLOL SUCCINATE 50 MG/1
50 TABLET, EXTENDED RELEASE ORAL DAILY
Qty: 90 TABLET | Refills: 3 | Status: SHIPPED | OUTPATIENT
Start: 2024-12-29

## 2024-12-30 ENCOUNTER — OFFICE VISIT (OUTPATIENT)
Age: 74
End: 2024-12-30
Payer: MEDICARE

## 2024-12-30 VITALS — WEIGHT: 260 LBS | BODY MASS INDEX: 47.84 KG/M2 | HEIGHT: 62 IN

## 2024-12-30 DIAGNOSIS — M54.50 CHRONIC LOW BACK PAIN, UNSPECIFIED BACK PAIN LATERALITY, UNSPECIFIED WHETHER SCIATICA PRESENT: Primary | ICD-10-CM

## 2024-12-30 DIAGNOSIS — G89.29 CHRONIC LOW BACK PAIN, UNSPECIFIED BACK PAIN LATERALITY, UNSPECIFIED WHETHER SCIATICA PRESENT: Primary | ICD-10-CM

## 2024-12-30 PROCEDURE — 3017F COLORECTAL CA SCREEN DOC REV: CPT | Performed by: ORTHOPAEDIC SURGERY

## 2024-12-30 PROCEDURE — 99204 OFFICE O/P NEW MOD 45 MIN: CPT | Performed by: ORTHOPAEDIC SURGERY

## 2024-12-30 PROCEDURE — 1159F MED LIST DOCD IN RCRD: CPT | Performed by: ORTHOPAEDIC SURGERY

## 2024-12-30 PROCEDURE — 1036F TOBACCO NON-USER: CPT | Performed by: ORTHOPAEDIC SURGERY

## 2024-12-30 PROCEDURE — G8427 DOCREV CUR MEDS BY ELIG CLIN: HCPCS | Performed by: ORTHOPAEDIC SURGERY

## 2024-12-30 PROCEDURE — G8400 PT W/DXA NO RESULTS DOC: HCPCS | Performed by: ORTHOPAEDIC SURGERY

## 2024-12-30 PROCEDURE — 1123F ACP DISCUSS/DSCN MKR DOCD: CPT | Performed by: ORTHOPAEDIC SURGERY

## 2024-12-30 PROCEDURE — G8484 FLU IMMUNIZE NO ADMIN: HCPCS | Performed by: ORTHOPAEDIC SURGERY

## 2024-12-30 PROCEDURE — G8417 CALC BMI ABV UP PARAM F/U: HCPCS | Performed by: ORTHOPAEDIC SURGERY

## 2024-12-30 PROCEDURE — 1090F PRES/ABSN URINE INCON ASSESS: CPT | Performed by: ORTHOPAEDIC SURGERY

## 2024-12-30 NOTE — PROGRESS NOTES
Name: Peggy Ellis  YOB: 1950  Gender: female  MRN: [id number]  Age: 74 y.o.    Chief Complaint:   Chief Complaint   Patient presents with    New Patient     Low back pain into right leg          History of Present Illness:      This is a very pleasant 74 y.o. female who presents with a 3-week history of low back and right lower extremity symptoms.  There was a gradual onset.  She does not report any significant weakness unrelated to pain.  She has never had surgery before.  She has been taking Tylenol with some relief.  She was prescribed meloxicam by one of her pain management specialist, but was fearful to take this due to heart complications.  Of note, she has severe bilateral knee osteoarthritis and deformity but has been told that she is not a surgical candidate.  She tells me that she is scheduled for nerve ablations in bilateral knees sometime in February 2025.  She tells me that she has been trying some home exercises and stretching that a family member provided to her.        Social History:  Social History     Tobacco Use    Smoking status: Never    Smokeless tobacco: Never   Substance Use Topics    Alcohol use: No        What is your occupation?  Retired    Are you disabled? no    Who do you live with? Spouse    Who referred you here?   No ref. provider found      Medications:  Prior to Visit Medications    Medication Sig Taking? Authorizing Provider   amLODIPine (NORVASC) 5 MG tablet TAKE 1 TABLET EVERY DAY  Tone Noyola MD   potassium chloride (KLOR-CON M) 10 MEQ extended release tablet TAKE 1 TABLET BY MOUTH DAILY  Tone Noyola MD   furosemide (LASIX) 40 MG tablet TAKE 1 TABLET EVERY MORNING AND TAKE 1/2 TABLET EVERY EVENING  Tone Noyola MD   ramipril (ALTACE) 10 MG capsule TAKE 1 CAPSULE EVERY DAY  Tone Noyola MD   metoprolol succinate (TOPROL XL) 50 MG extended release tablet TAKE 1 TABLET EVERY DAY  Tone Noyola MD   meloxicam

## 2025-01-05 ENCOUNTER — TELEPHONE (OUTPATIENT)
Dept: FAMILY MEDICINE CLINIC | Facility: CLINIC | Age: 75
End: 2025-01-05

## 2025-01-05 DIAGNOSIS — E11.65 TYPE 2 DIABETES MELLITUS WITH HYPERGLYCEMIA, WITHOUT LONG-TERM CURRENT USE OF INSULIN (HCC): ICD-10-CM

## 2025-01-05 DIAGNOSIS — I10 PRIMARY HYPERTENSION: Primary | ICD-10-CM

## 2025-01-08 ENCOUNTER — LAB (OUTPATIENT)
Dept: FAMILY MEDICINE CLINIC | Facility: CLINIC | Age: 75
End: 2025-01-08

## 2025-01-08 DIAGNOSIS — I10 PRIMARY HYPERTENSION: ICD-10-CM

## 2025-01-08 DIAGNOSIS — E11.65 TYPE 2 DIABETES MELLITUS WITH HYPERGLYCEMIA, WITHOUT LONG-TERM CURRENT USE OF INSULIN (HCC): ICD-10-CM

## 2025-01-08 LAB
ALBUMIN SERPL-MCNC: 4 G/DL (ref 3.2–4.6)
ALBUMIN/GLOB SERPL: 1.1 (ref 1–1.9)
ALP SERPL-CCNC: 96 U/L (ref 35–104)
ALT SERPL-CCNC: 16 U/L (ref 8–45)
ANION GAP SERPL CALC-SCNC: 12 MMOL/L (ref 7–16)
AST SERPL-CCNC: 22 U/L (ref 15–37)
BASOPHILS # BLD: 0.03 K/UL (ref 0–0.2)
BASOPHILS NFR BLD: 0.4 % (ref 0–2)
BILIRUB SERPL-MCNC: 0.3 MG/DL (ref 0–1.2)
BUN SERPL-MCNC: 17 MG/DL (ref 8–23)
CALCIUM SERPL-MCNC: 10 MG/DL (ref 8.8–10.2)
CHLORIDE SERPL-SCNC: 103 MMOL/L (ref 98–107)
CO2 SERPL-SCNC: 25 MMOL/L (ref 20–29)
CREAT SERPL-MCNC: 0.68 MG/DL (ref 0.6–1.1)
DIFFERENTIAL METHOD BLD: NORMAL
EOSINOPHIL # BLD: 0.07 K/UL (ref 0–0.8)
EOSINOPHIL NFR BLD: 1 % (ref 0.5–7.8)
ERYTHROCYTE [DISTWIDTH] IN BLOOD BY AUTOMATED COUNT: 13.1 % (ref 11.9–14.6)
EST. AVERAGE GLUCOSE BLD GHB EST-MCNC: 142 MG/DL
GLOBULIN SER CALC-MCNC: 3.5 G/DL (ref 2.3–3.5)
GLUCOSE SERPL-MCNC: 131 MG/DL (ref 70–99)
HBA1C MFR BLD: 6.6 % (ref 0–5.6)
HCT VFR BLD AUTO: 39.5 % (ref 35.8–46.3)
HGB BLD-MCNC: 12.9 G/DL (ref 11.7–15.4)
IMM GRANULOCYTES # BLD AUTO: 0.02 K/UL (ref 0–0.5)
IMM GRANULOCYTES NFR BLD AUTO: 0.3 % (ref 0–5)
LYMPHOCYTES # BLD: 2.15 K/UL (ref 0.5–4.6)
LYMPHOCYTES NFR BLD: 30.2 % (ref 13–44)
MCH RBC QN AUTO: 30.2 PG (ref 26.1–32.9)
MCHC RBC AUTO-ENTMCNC: 32.7 G/DL (ref 31.4–35)
MCV RBC AUTO: 92.5 FL (ref 82–102)
MONOCYTES # BLD: 0.58 K/UL (ref 0.1–1.3)
MONOCYTES NFR BLD: 8.2 % (ref 4–12)
NEUTS SEG # BLD: 4.26 K/UL (ref 1.7–8.2)
NEUTS SEG NFR BLD: 59.9 % (ref 43–78)
NRBC # BLD: 0 K/UL (ref 0–0.2)
PLATELET # BLD AUTO: 244 K/UL (ref 150–450)
PMV BLD AUTO: 10.4 FL (ref 9.4–12.3)
POTASSIUM SERPL-SCNC: 4.5 MMOL/L (ref 3.5–5.1)
PROT SERPL-MCNC: 7.5 G/DL (ref 6.3–8.2)
RBC # BLD AUTO: 4.27 M/UL (ref 4.05–5.2)
SODIUM SERPL-SCNC: 140 MMOL/L (ref 136–145)
WBC # BLD AUTO: 7.1 K/UL (ref 4.3–11.1)

## 2025-01-13 SDOH — ECONOMIC STABILITY: INCOME INSECURITY: IN THE LAST 12 MONTHS, WAS THERE A TIME WHEN YOU WERE NOT ABLE TO PAY THE MORTGAGE OR RENT ON TIME?: NO

## 2025-01-13 SDOH — ECONOMIC STABILITY: FOOD INSECURITY: WITHIN THE PAST 12 MONTHS, YOU WORRIED THAT YOUR FOOD WOULD RUN OUT BEFORE YOU GOT MONEY TO BUY MORE.: NEVER TRUE

## 2025-01-13 SDOH — ECONOMIC STABILITY: FOOD INSECURITY: WITHIN THE PAST 12 MONTHS, THE FOOD YOU BOUGHT JUST DIDN'T LAST AND YOU DIDN'T HAVE MONEY TO GET MORE.: NEVER TRUE

## 2025-01-13 SDOH — ECONOMIC STABILITY: TRANSPORTATION INSECURITY
IN THE PAST 12 MONTHS, HAS LACK OF TRANSPORTATION KEPT YOU FROM MEETINGS, WORK, OR FROM GETTING THINGS NEEDED FOR DAILY LIVING?: NO

## 2025-01-13 SDOH — ECONOMIC STABILITY: TRANSPORTATION INSECURITY
IN THE PAST 12 MONTHS, HAS THE LACK OF TRANSPORTATION KEPT YOU FROM MEDICAL APPOINTMENTS OR FROM GETTING MEDICATIONS?: NO

## 2025-01-13 ASSESSMENT — PATIENT HEALTH QUESTIONNAIRE - PHQ9
1. LITTLE INTEREST OR PLEASURE IN DOING THINGS: NOT AT ALL
2. FEELING DOWN, DEPRESSED OR HOPELESS: NOT AT ALL
SUM OF ALL RESPONSES TO PHQ QUESTIONS 1-9: 0
SUM OF ALL RESPONSES TO PHQ QUESTIONS 1-9: 0
2. FEELING DOWN, DEPRESSED OR HOPELESS: NOT AT ALL
SUM OF ALL RESPONSES TO PHQ QUESTIONS 1-9: 0
1. LITTLE INTEREST OR PLEASURE IN DOING THINGS: NOT AT ALL
SUM OF ALL RESPONSES TO PHQ9 QUESTIONS 1 & 2: 0
SUM OF ALL RESPONSES TO PHQ QUESTIONS 1-9: 0
SUM OF ALL RESPONSES TO PHQ9 QUESTIONS 1 & 2: 0

## 2025-01-15 ENCOUNTER — OFFICE VISIT (OUTPATIENT)
Dept: FAMILY MEDICINE CLINIC | Facility: CLINIC | Age: 75
End: 2025-01-15

## 2025-01-15 VITALS
WEIGHT: 265 LBS | BODY MASS INDEX: 48.76 KG/M2 | HEIGHT: 62 IN | RESPIRATION RATE: 20 BRPM | HEART RATE: 61 BPM | DIASTOLIC BLOOD PRESSURE: 56 MMHG | SYSTOLIC BLOOD PRESSURE: 138 MMHG | OXYGEN SATURATION: 97 % | TEMPERATURE: 97.2 F

## 2025-01-15 DIAGNOSIS — M17.10 ARTHRITIS OF KNEE: ICD-10-CM

## 2025-01-15 DIAGNOSIS — I35.0 NONRHEUMATIC AORTIC VALVE STENOSIS: ICD-10-CM

## 2025-01-15 DIAGNOSIS — I50.9 CONGESTIVE HEART FAILURE, UNSPECIFIED HF CHRONICITY, UNSPECIFIED HEART FAILURE TYPE (HCC): ICD-10-CM

## 2025-01-15 DIAGNOSIS — I10 PRIMARY HYPERTENSION: ICD-10-CM

## 2025-01-15 DIAGNOSIS — I27.20 PULMONARY HTN (HCC): ICD-10-CM

## 2025-01-15 DIAGNOSIS — E11.65 TYPE 2 DIABETES MELLITUS WITH HYPERGLYCEMIA, WITHOUT LONG-TERM CURRENT USE OF INSULIN (HCC): Primary | ICD-10-CM

## 2025-01-15 DIAGNOSIS — Z79.899 ENCOUNTER FOR LONG-TERM (CURRENT) USE OF MEDICATIONS: ICD-10-CM

## 2025-01-15 RX ORDER — METOPROLOL SUCCINATE 50 MG/1
50 TABLET, EXTENDED RELEASE ORAL DAILY
Qty: 90 TABLET | Refills: 3 | Status: SHIPPED | OUTPATIENT
Start: 2025-01-15

## 2025-01-15 RX ORDER — RAMIPRIL 10 MG/1
CAPSULE ORAL
Qty: 90 CAPSULE | Refills: 3 | Status: SHIPPED | OUTPATIENT
Start: 2025-01-15

## 2025-01-15 RX ORDER — AMLODIPINE BESYLATE 5 MG/1
5 TABLET ORAL DAILY
Qty: 90 TABLET | Refills: 3 | Status: SHIPPED | OUTPATIENT
Start: 2025-01-15

## 2025-01-15 RX ORDER — POTASSIUM CHLORIDE 750 MG/1
10 TABLET, EXTENDED RELEASE ORAL DAILY
Qty: 90 TABLET | Refills: 3 | Status: SHIPPED | OUTPATIENT
Start: 2025-01-15

## 2025-01-15 RX ORDER — FUROSEMIDE 40 MG/1
TABLET ORAL
Qty: 135 TABLET | Refills: 3 | Status: SHIPPED | OUTPATIENT
Start: 2025-01-15

## 2025-01-15 NOTE — PROGRESS NOTES
of Exercise per Session: Not on file   Stress: Not on file   Social Connections: Unknown (10/29/2021)    Received from [employer]    Social Connections     Frequency of Communication with Friends and Family: Not asked     Frequency of Social Gatherings with Friends and Family: Not asked   Intimate Partner Violence: Unknown (10/29/2021)    Received from [employer]    Intimate Partner Violence     Fear of Current or Ex-Partner: Not asked     Emotionally Abused: Not asked     Physically Abused: Not asked     Sexually Abused: Not asked   Housing Stability: Low Risk  (1/13/2025)    Housing Stability Vital Sign     Unable to Pay for Housing in the Last Year: No     Number of Times Moved in the Last Year: 0     Homeless in the Last Year: No       Ms. Ellis  has the following allergies: No Known Allergies    BP (!) 138/56 (Site: Left Upper Arm, Position: Sitting)   Pulse 61   Temp 97.2 °F (36.2 °C) (Temporal)   Resp 20   Ht 1.575 m (5' 2\")   Wt 120.2 kg (265 lb)   SpO2 97%   BMI 48.47 kg/m²     HEENT: Normocephalic, atraumatic, pupils equal and reactive to light. Tympanic membranes intact without erythema or edema. Oropharynx clear.   Neck: Supple, no masses or thyromegaly.  Lungs: clear to auscultation bilaterally.  CV: regular rate and rhythm, II/VI systolic murmur but no rubs, or gallops.    Ext: No lower extremity edema.    Diabetic foot exam:   Left Foot:   Visual Exam: normal   Pulse DP: 2+ (normal)   Filament test: normal sensation     Right Foot:   Visual Exam: normal   Pulse DP: 2+ (normal)   Filament test: normal sensation   She has a slight nick in her skin along the medial side of her right great toenail from where she trimmed her toenails and there is some blood there.  We did clean and wrapped that while she was here.    We did go over her blood work and all was good.    No results found for this visit on 01/15/25.    Peggy was seen today for follow-up chronic

## 2025-02-06 NOTE — PROGRESS NOTES
Procedure Date: 25      Location: [facility]       Procedure: bilateral genicular block       Time Out performed prior to start of the procedure:       Dr Huy Jackson performed the following reviews on [facility] 1950 prior to the start of the procedure:       patient was identified by name and     agreement on procedure being performed was verified   risks and benefits explained to patient by the provider  procedure site verified as Bilateral  patient was positioned for comfort   consent signed and verified for procedure       Time:  9:47 AM        Procedure performed by:   Dr Huy Jackson      Patient assisted by:   Lou Dangelo MA

## 2025-02-11 ENCOUNTER — TELEPHONE (OUTPATIENT)
Dept: FAMILY MEDICINE CLINIC | Facility: CLINIC | Age: 75
End: 2025-02-11

## 2025-02-11 ENCOUNTER — OFFICE VISIT (OUTPATIENT)
Dept: ORTHOPEDIC SURGERY | Age: 75
End: 2025-02-11
Payer: MEDICARE

## 2025-02-11 DIAGNOSIS — M25.562 PAIN IN BOTH KNEES, UNSPECIFIED CHRONICITY: Primary | ICD-10-CM

## 2025-02-11 DIAGNOSIS — M25.561 PAIN IN BOTH KNEES, UNSPECIFIED CHRONICITY: Primary | ICD-10-CM

## 2025-02-11 PROCEDURE — 64454 NJX AA&/STRD GNCLR NRV BRNCH: CPT | Performed by: ANESTHESIOLOGY

## 2025-02-11 NOTE — PROGRESS NOTES
[facility]    NAME: Peggy Ellis  ID:[id number]   :1950    Location: [facility]    Procedure: bilateral Genicular Nerve Blocks Under Fluoroscopic Imaging  Lateral Retinacular Nerve Branch of the Sciatic Nerve.  Medial Retinacular Nerve Branch of the Femoral Nerve  3.   Infrapatellar Nerve Branch of the Saphenous Nerve     Pre-op Diagnosis: Knee Osteoarthritis    Post-op Diagnosis: Same     Anesthesia: Local only     Complications: None    After confirming written and informed consent and discussing the risk, benefits and alternatives for the procedure, the patient had the correct site marked by the physician performing the procedure. The specific risks of bleeding and infection were discussed. The patient was taken to the fluoroscopy suite.    The patient was placed in the supine position. A pulse oximeter was placed, and verbal and visual monitoring were maintained throughout the procedure. The physician cleaned his hands with an alcohol containing solution. The physician wore a hat and mask, sterile gloves were applied. The skin overlying the left then right knee was cleaned with chlorhexadine gluconate. Sterile towels were applied as a drape. A timeout was performed involving the patient, physician and radiation technologist.  The expected path of the lateral retinacular nerve (superiolateral geniculate nerve), a branch of the sciatic nerve. was then identified using fluoroscopy. The skin overlying the superiolateral femoral condyle was anesthetized with 1% lidocaine via a 25 G 1.5 inch needle. Next, using a coaxial technique with intermittent fluoroscopic guidance a 25 G 3.5 inch spinal needle was advanced toward the lateral midpoint of the femoral shaft at the junction of the superiolateral femoral condyle and shaft.    Next, the expected path of the medial retinacular nerve, a branch of the femoral nerve (superiomedial geniculate nerve) was then identified using fluoroscopy. The skin overlying the

## 2025-02-12 ENCOUNTER — TELEPHONE (OUTPATIENT)
Age: 75
End: 2025-02-12

## 2025-02-12 NOTE — TELEPHONE ENCOUNTER
Theodorevm to review procedure results. Also sending [employer] message    Procedure: bilateral genicular block    Procedure Date: 2/11/25    Pt replied via [employer] stating 10-15% relief.    Scheduled office visit to follow up 2/24/25

## 2025-02-25 ENCOUNTER — OFFICE VISIT (OUTPATIENT)
Age: 75
End: 2025-02-25
Payer: MEDICARE

## 2025-02-25 DIAGNOSIS — M17.0 BILATERAL PRIMARY OSTEOARTHRITIS OF KNEE: Primary | ICD-10-CM

## 2025-02-25 PROCEDURE — 1123F ACP DISCUSS/DSCN MKR DOCD: CPT | Performed by: PHYSICIAN ASSISTANT

## 2025-02-25 PROCEDURE — G8417 CALC BMI ABV UP PARAM F/U: HCPCS | Performed by: PHYSICIAN ASSISTANT

## 2025-02-25 PROCEDURE — 1036F TOBACCO NON-USER: CPT | Performed by: PHYSICIAN ASSISTANT

## 2025-02-25 PROCEDURE — G8400 PT W/DXA NO RESULTS DOC: HCPCS | Performed by: PHYSICIAN ASSISTANT

## 2025-02-25 PROCEDURE — 1090F PRES/ABSN URINE INCON ASSESS: CPT | Performed by: PHYSICIAN ASSISTANT

## 2025-02-25 PROCEDURE — 3017F COLORECTAL CA SCREEN DOC REV: CPT | Performed by: PHYSICIAN ASSISTANT

## 2025-02-25 PROCEDURE — 99214 OFFICE O/P EST MOD 30 MIN: CPT | Performed by: PHYSICIAN ASSISTANT

## 2025-02-25 PROCEDURE — G8428 CUR MEDS NOT DOCUMENT: HCPCS | Performed by: PHYSICIAN ASSISTANT

## 2025-02-25 RX ORDER — TIZANIDINE 2 MG/1
2 TABLET ORAL 3 TIMES DAILY PRN
Qty: 15 TABLET | Refills: 1 | Status: SHIPPED | OUTPATIENT
Start: 2025-02-25 | End: 2025-03-07

## 2025-02-25 ASSESSMENT — ENCOUNTER SYMPTOMS
ABDOMINAL PAIN: 0
EYES NEGATIVE: 1
SHORTNESS OF BREATH: 0
ALLERGIC/IMMUNOLOGIC NEGATIVE: 1

## 2025-02-25 NOTE — PROGRESS NOTES
Mrs. Ellis is a follow-up for end-stage osteoarthritis of the knees.  She was sent to us for genicular blocks.  She underwent bilateral knee viscosupplementation with myself which gave her no significant pain relief.  We proceeded with genicular blocks which were performed on February 11 and unfortunately also gave her only 10 to 15% pain relief.

## 2025-02-25 NOTE — PROGRESS NOTES
Chronic Pain Consult Note      Plan:     A comprehensive pain management plan may consist of the following: Testing, Therapy, Medications, Interventions, Consults, and Follow up.    Chronic bilateral knee pain  S/p for bilateral viscosupplementation injections (series of 3) with 0% pain relief.   Encourage continued home exercises  Failed Visco   Stop Mobic which she never trialed.   Bilateral genicular blocks provided 80% pain relief .  Start with genicular ablation on the L knee. If successful, proceed with genicular ablation of R knee.   Provided short course of tizanidine to use prn spasms after procedure IF NEEDED. Caution with sedation on medicine.   Bilateral knee primary osteoarthritis  Anatomically patient is a candidate for knee replacements however due to comorbidities including cardiovascular compromise and weight patient is not a candidate.    General Recommendations: The pain condition that the patient suffers from is best treated with a multidisciplinary approach that involves an increase in physical activity to prevent de-conditioning and worsening of the pain cycle, as well as psychological counseling (formal and/or informal) to address the co morbid psychological effects of pain. Treatment will often involve judicious use of pain medications and interventional procedures to decrease the pain, allowing the patient to participate in the physical activity that will ultimately produce long-lasting pain reductions. The goal of the multidisciplinary approach is to return the patient to a higher level of overall function and to restore their ability to perform activities of daily living.      Referring Provider: No ref. provider found  Assessment:      Chief Complaint: Follow-up      Peggy Ellis is a 74 y.o. female [facility] the following diagnoses:    Diagnosis:  1. Bilateral primary osteoarthritis of knee            Subjective:      HPI:  Ms. Ellis is seen in

## 2025-03-19 NOTE — PROGRESS NOTES
Procedure Date: 2025     Location: [facility]       Procedure: Left Genicular Ablation       Time Out performed prior to start of the procedure:       Huy Jackson MD  performed the following reviews on [facility] 1950 prior to the start of the procedure:       patient was identified by name and     agreement on procedure being performed was verified   risks and benefits explained to patient by the provider  procedure site verified as Left  patient was positioned for comfort   consent signed and verified for procedure       Time:  2:45 pm        Procedure performed by:   Huy Jackson MD       Patient assisted by:   ZOE NORTON MA

## 2025-03-23 NOTE — PROGRESS NOTES
[facility] CARDIOLOGY  [address] [place] [address], [address]  [place] [address]  PHONE: [phone]        NAME:  Peggy Ellis  : 1950  MRN: [id number]     PCP:  Марина Lee MD      SUBJECTIVE:   Peggy Ellis is a 74 y.o. female seen for a follow up visit regarding the following:     Chief Complaint   Patient presents with    Nonrheumatic aortic valve stenosis       HPI:    She presented recently to establish new cardiac care with a history of echocardiogram in  demonstrating moderate aortic stenosis but normal left ventricular function.  She carries a history of pulmonary hypertension as well.  Regardless she denies any symptoms of severe aortic stenosis and specifically denies angina, syncope, or CHF.  She has chronic lower extremity dependent edema which is worse at the end of the day, but wears lymphedema wraps and has mild edema/chronic venous stasis changes today below both knees.  She denies orthopnea or PND.  She has no prior coronary history.      She is wrapping her legs each day with the help of her .  She is encouraged to continue with lifestyle modification including diet, weight loss, leg elevation and leg wrapping as needed... She has been dieting and her weight is down 45-50 pounds from her peak.  She is encouraged to continue weight loss measures so that hopefully when her aortic stenosis becomes severe she will be a better candidate for TAVR.         The pathophysiology and progression of aortic stenosis was discussed with her today.  Aggressive weight loss measures need to be employed over the next year or two in order to improve her postoperative morbidity/mortality if and when it comes time to operate with SAVR/TAVR.    Echocardiogram 3/8/2024:  Left Ventricle Normal left ventricular systolic function with a visually estimated EF of 70 - 75%. Left ventricle size is normal. Mildly increased wall thickness. Findings consistent with mild concentric

## 2025-03-25 ENCOUNTER — OFFICE VISIT (OUTPATIENT)
Age: 75
End: 2025-03-25
Payer: MEDICARE

## 2025-03-25 VITALS
HEART RATE: 66 BPM | DIASTOLIC BLOOD PRESSURE: 70 MMHG | BODY MASS INDEX: 48.21 KG/M2 | HEIGHT: 62 IN | SYSTOLIC BLOOD PRESSURE: 138 MMHG | WEIGHT: 262 LBS

## 2025-03-25 DIAGNOSIS — I35.0 NONRHEUMATIC AORTIC VALVE STENOSIS: Primary | ICD-10-CM

## 2025-03-25 DIAGNOSIS — I10 PRIMARY HYPERTENSION: ICD-10-CM

## 2025-03-25 DIAGNOSIS — I27.20 PULMONARY HTN (HCC): ICD-10-CM

## 2025-03-25 DIAGNOSIS — E11.65 TYPE 2 DIABETES MELLITUS WITH HYPERGLYCEMIA, WITHOUT LONG-TERM CURRENT USE OF INSULIN (HCC): ICD-10-CM

## 2025-03-25 DIAGNOSIS — I89.0 LYMPHEDEMA: ICD-10-CM

## 2025-03-25 PROCEDURE — 1159F MED LIST DOCD IN RCRD: CPT | Performed by: INTERNAL MEDICINE

## 2025-03-25 PROCEDURE — G8400 PT W/DXA NO RESULTS DOC: HCPCS | Performed by: INTERNAL MEDICINE

## 2025-03-25 PROCEDURE — 1160F RVW MEDS BY RX/DR IN RCRD: CPT | Performed by: INTERNAL MEDICINE

## 2025-03-25 PROCEDURE — 3078F DIAST BP <80 MM HG: CPT | Performed by: INTERNAL MEDICINE

## 2025-03-25 PROCEDURE — G8427 DOCREV CUR MEDS BY ELIG CLIN: HCPCS | Performed by: INTERNAL MEDICINE

## 2025-03-25 PROCEDURE — 3075F SYST BP GE 130 - 139MM HG: CPT | Performed by: INTERNAL MEDICINE

## 2025-03-25 PROCEDURE — 1036F TOBACCO NON-USER: CPT | Performed by: INTERNAL MEDICINE

## 2025-03-25 PROCEDURE — 1090F PRES/ABSN URINE INCON ASSESS: CPT | Performed by: INTERNAL MEDICINE

## 2025-03-25 PROCEDURE — 93000 ELECTROCARDIOGRAM COMPLETE: CPT | Performed by: INTERNAL MEDICINE

## 2025-03-25 PROCEDURE — 3044F HG A1C LEVEL LT 7.0%: CPT | Performed by: INTERNAL MEDICINE

## 2025-03-25 PROCEDURE — 2022F DILAT RTA XM EVC RTNOPTHY: CPT | Performed by: INTERNAL MEDICINE

## 2025-03-25 PROCEDURE — G8417 CALC BMI ABV UP PARAM F/U: HCPCS | Performed by: INTERNAL MEDICINE

## 2025-03-25 PROCEDURE — 1123F ACP DISCUSS/DSCN MKR DOCD: CPT | Performed by: INTERNAL MEDICINE

## 2025-03-25 PROCEDURE — 1126F AMNT PAIN NOTED NONE PRSNT: CPT | Performed by: INTERNAL MEDICINE

## 2025-03-25 PROCEDURE — 99214 OFFICE O/P EST MOD 30 MIN: CPT | Performed by: INTERNAL MEDICINE

## 2025-03-25 PROCEDURE — 3017F COLORECTAL CA SCREEN DOC REV: CPT | Performed by: INTERNAL MEDICINE

## 2025-03-26 ENCOUNTER — OFFICE VISIT (OUTPATIENT)
Age: 75
End: 2025-03-26
Payer: MEDICARE

## 2025-03-26 DIAGNOSIS — M17.0 BILATERAL PRIMARY OSTEOARTHRITIS OF KNEE: Primary | ICD-10-CM

## 2025-03-26 PROCEDURE — 64624 DSTRJ NULYT AGT GNCLR NRV: CPT | Performed by: ANESTHESIOLOGY

## 2025-03-26 NOTE — PROGRESS NOTES
[facility]    NAME: Peggy Ellis   ID:[id number]   :1950    Location: 390    Procedure:left Genicular Nerve Radiofrequency Ablation Under Fluoroscopic Imaging  Lateral Retinacular Nerve Branch of the Sciatic Nerve.  Medial Retinacular Nerve Branch of the Femoral Nerve  lnfrapatellar Nerve Branch of the Saphenous Nerve       Pre-op Diagnosis: Knee Osteoarthritis    Post-op Diagnosis: Same     Anesthesia: Local only     Complications: None    After confirming written and informed consent and discussing the risk, benefits and alternatives for the procedure, the patient had the correct site marked by the physician performing the procedure. The specific risks of bleeding, infection and nerve injury were discussed. The patient was taken to the fluoroscopy suite.    The patient was placed in the supine position. A pulse oximeter was placed, and verbal and visual monitoring were maintained throughout the procedure. The physician cleaned his hands with an alcohol containing  solution. The physician wore a hat and mask, sterile gloves were applied. The skin overlying the left knee was cleaned with chlorhexadine gluconate. Sterile towels were applied as a drape. A timeout was performed involving the patient, physician and radiology technician.    A20 G, 10 cm cannula with 5 mm active tip was used for each nerve.    The expected path of the lateral retinacular nerve (superiolateral geniculate nerve), a branch of the sciatic nerve. was then identified using fluoroscopy. The skin overlying the superiolateral femoral condyle was anesthetized with 1% lidocaine via a 25 G 1.5 inch needle. Next, using a coaxial technique with intermittent fluoroscopic guidance the radiofrequency cannula was advanced toward the lateral midpoint of the femoral shaft at the junction of the superiolateral femoral condyle and shaft.    Next, the expected path of the medial retinacular nerve, a branch of the femoral nerve (superiomedial

## 2025-04-07 ENCOUNTER — OFFICE VISIT (OUTPATIENT)
Age: 75
End: 2025-04-07
Payer: MEDICARE

## 2025-04-07 DIAGNOSIS — M25.561 BILATERAL CHRONIC KNEE PAIN: Primary | ICD-10-CM

## 2025-04-07 DIAGNOSIS — M25.562 BILATERAL CHRONIC KNEE PAIN: Primary | ICD-10-CM

## 2025-04-07 DIAGNOSIS — G89.29 BILATERAL CHRONIC KNEE PAIN: Primary | ICD-10-CM

## 2025-04-07 DIAGNOSIS — M17.0 BILATERAL PRIMARY OSTEOARTHRITIS OF KNEE: ICD-10-CM

## 2025-04-07 PROCEDURE — 64624 DSTRJ NULYT AGT GNCLR NRV: CPT | Performed by: ANESTHESIOLOGY

## 2025-04-07 NOTE — PROGRESS NOTES
Procedure Date: 2025      Location: [facility]       Procedure: RIGHT KNEE RFA     Time Out performed prior to start of the procedure:       Huy Jackson MD performed the following reviews on [facility] 1950 prior to the start of the procedure:       patient was identified by name and     agreement on procedure being performed was verified   risks and benefits explained to patient by the provider  procedure site verified as Right  patient was positioned for comfort   consent signed and verified for procedure       Time:  2:19 PM        Procedure performed by:   Huy Jackson MD      Patient assisted by:   KEDAR EDWARD   
geniculate nerve) was then identified using fluoroscopy. The skin overlying the superiomedial femoral condyle was anesthetized with 1% lidocaine via a 25 G 1.5 inch needle. Next, using a coaxial technique with intermittent fluoroscopic guidance the radiofrequency cannula was advanced toward the lateral midpoint of the femoral shaft at the junction of the superiomedial femoral condyle and shaft.    Next, the expected path of the infrapatellar branch of the saphenous nerve (inferiomedial geniculate nerve) was then identified using fluoroscopy. The skin overlying the superiomedial tibial condyle was anesthetized with 1% lidocaine via a 25 G 1.5 inch needle. Next, using a coaxial technique with intermittent fluoroscopic guidance the radiofrequency cannula was advanced toward the lateral midpoint of the femoral shaft at the junction of the inferiolateral femoral condyle and shaft.    Sensory testing as undertaken. 2 ml of 2% lidocaine were then injected. 120 seconds were allowed to elapse before ablation was carried out for 150 seconds at 90 degrees F.    The needles were then removed. The patient tolerated the procedure without difficulty. The patient was transported to the recovery room and monitored for an appropriate amount of time, and after meeting discharge criteria, was discharged home with a . No complications were noted through the procedure or recovery period.

## 2025-04-29 ENCOUNTER — HOSPITAL ENCOUNTER (OUTPATIENT)
Dept: PHYSICAL THERAPY | Age: 75
Setting detail: RECURRING SERIES
Discharge: HOME | End: 2025-05-02
Payer: MEDICARE

## 2025-04-29 DIAGNOSIS — I87.2 CHRONIC VENOUS INSUFFICIENCY: ICD-10-CM

## 2025-04-29 DIAGNOSIS — I89.0 LYMPHEDEMA, NOT ELSEWHERE CLASSIFIED: Primary | ICD-10-CM

## 2025-04-29 DIAGNOSIS — R26.2 DIFFICULTY IN WALKING, NOT ELSEWHERE CLASSIFIED: ICD-10-CM

## 2025-04-29 PROCEDURE — 97166 OT EVAL MOD COMPLEX 45 MIN: CPT

## 2025-04-29 ASSESSMENT — PAIN SCALES - GENERAL: PAINLEVEL_OUTOF10: 10

## 2025-04-29 NOTE — PROGRESS NOTES
Peggy Ellis  : 1950  Primary: [facility] Medicare Complete (Medicare Managed)  Secondary:  [facility] @ [facility]  [address] [place] [address]  Phone: [phone]  Fax: [phone]    Plan of Care/Certification Expiration Date:  Certification Period Expiration Date: 25      Frequency/Duration: Plan Frequency: 2x/week      Time In/Out:   Time In: 0245  Time Out: 0325    OT Visit Info:  Plan Frequency: 2x/week       Visit Count: Visit count could not be calculated. Make sure you are using a visit which is associated with an episode.   OUTPATIENT OCCUPATIONAL THERAPY: Treatment Note 2025  Episode: (LE lymphedema)         Treatment Diagnosis:    Lymphedema, not elsewhere classified  Chronic venous insufficiency  Difficulty in walking, not elsewhere classified  Medical/Referring Diagnosis:   Lymphedema   Referring Physician:  Greg Doshi MD MD Orders:  OT Eval and Treat   Return MD Appt:  TBD   Date of Onset:  Onset Date: 05     Allergies:  Patient has no known allergies.  Restrictions/Precautions:   None      Medications Last Reviewed:  2025     Interventions Planned (Treatment may consist of any combination of the following):     Manual therapy: manual lymph drainage, Self care/ADL: multi layer compression bandaging, compression garment fitting, skin care, home program development, patient/caregiver training/education, ADL/home management training, Therapeutic exercises: ROM, strengthening       Subjective Comments:   \"I need new compression\"   >Initial Pain Level:     10/10  >Post Session Pain Level:     10/10  Medications Last Reviewed:  2025  Updated Objective Findings:  See Evaluation Note from today  Treatment   MANUAL THERAPY: ( minutes):   Manual lymphatic drainage was utilized and necessary because of the patient's  stage 2 BLE lymphedema .   Manual Lymph Drainage:   Lymph Nodes:    Cervical Supraclavicular Axillary

## 2025-04-29 NOTE — THERAPY EVALUATION
[] 29/ 30.5          [place]   []      [] 26.5/ 29        Measurements are taken in centimeters:  2.54 cm = 1 inch   Cumulative circumferential volumetric graph measurements  RLE total size 183.5  cm        LLE total size 186cm              Outcome Measure:   Tool Used: Lymphedema Life Impact Scale   Score:  Initial: 13 Most Recent: X (Date: -- )   Interpretation of Score: The Lymphedema Life Impact Scale (LLIS) is a validated instrument that measures the physical, functional, and psychosocial concerns pertinent to patients with extremity lymphedema.  The Scale's questionnaire is administered to patients to gauge impairments, activity limitations, and participation restrictions resulting from their lymphedema.    ASSESSMENT   Initial Assessment:  Patient states swelling started 20 years ago when when she was a . She has worn 30-40mmHg compression socks for years but now finds them difficult to don/doff. She has Juzo velcro compression wraps but they are very worn and the velcro no longer sticks. She does not wear shoes, she wears gripper socks instead. Her mobility is significantly limited by severe knee pain from OA. She has tried multiped treatment options without success. She is not a surgical candidate. She walks with a rollator, sits most of the day with legs in dependent position due to knee pain and sleeps in a lift chair with legs only slightly elevated. Swelling has increased over the last 4-5 years specifically in the feet and ankles with a history of cellulitis. She is here today for lymphedema management in order to reduce swelling before ordering new garments.   Therapy Problem List: (Impacting functional limitations):      Decreased activity tolerance     Edema/girth    Decreased skin integrity/hygiene    Limb heaviness/pain   Therapy Prognosis:   Fair      Initial Assessment Complexity:   Moderate Complexity     PLAN   Effective Dates: 4/29/2025   TO Certification Period Expiration Date:

## 2025-05-19 NOTE — PROGRESS NOTES
Ms Rhonda is a f/u for a L genicular nerve ablation followed by R genicular nerve ablation. She is six weeks from the second ablation and 8 weeks from the first. She reports ***.

## 2025-05-20 ENCOUNTER — OFFICE VISIT (OUTPATIENT)
Age: 75
End: 2025-05-20
Payer: MEDICARE

## 2025-05-20 DIAGNOSIS — M25.561 BILATERAL CHRONIC KNEE PAIN: Primary | ICD-10-CM

## 2025-05-20 DIAGNOSIS — M25.562 BILATERAL CHRONIC KNEE PAIN: Primary | ICD-10-CM

## 2025-05-20 DIAGNOSIS — G89.29 BILATERAL CHRONIC KNEE PAIN: Primary | ICD-10-CM

## 2025-05-20 PROCEDURE — G8417 CALC BMI ABV UP PARAM F/U: HCPCS | Performed by: PHYSICIAN ASSISTANT

## 2025-05-20 PROCEDURE — 3017F COLORECTAL CA SCREEN DOC REV: CPT | Performed by: PHYSICIAN ASSISTANT

## 2025-05-20 PROCEDURE — 99213 OFFICE O/P EST LOW 20 MIN: CPT | Performed by: PHYSICIAN ASSISTANT

## 2025-05-20 PROCEDURE — 1036F TOBACCO NON-USER: CPT | Performed by: PHYSICIAN ASSISTANT

## 2025-05-20 PROCEDURE — 1123F ACP DISCUSS/DSCN MKR DOCD: CPT | Performed by: PHYSICIAN ASSISTANT

## 2025-05-20 PROCEDURE — 1159F MED LIST DOCD IN RCRD: CPT | Performed by: PHYSICIAN ASSISTANT

## 2025-05-20 PROCEDURE — 1090F PRES/ABSN URINE INCON ASSESS: CPT | Performed by: PHYSICIAN ASSISTANT

## 2025-05-20 PROCEDURE — G8400 PT W/DXA NO RESULTS DOC: HCPCS | Performed by: PHYSICIAN ASSISTANT

## 2025-05-20 PROCEDURE — G8427 DOCREV CUR MEDS BY ELIG CLIN: HCPCS | Performed by: PHYSICIAN ASSISTANT

## 2025-05-20 ASSESSMENT — ENCOUNTER SYMPTOMS
ABDOMINAL PAIN: 0
EYES NEGATIVE: 1
SHORTNESS OF BREATH: 0
ALLERGIC/IMMUNOLOGIC NEGATIVE: 1

## 2025-05-20 NOTE — PROGRESS NOTES
Chronic Pain Consult Note      Plan:     A comprehensive pain management plan may consist of the following: Testing, Therapy, Medications, Interventions, Consults, and Follow up.    Chronic bilateral knee pain  S/p for bilateral viscosupplementation injections (series of 3) with 0% pain relief.   Encourage continued home exercises  Failed Visco and steroid injection  She has Mobic 15mg which she never trialed.  She can try 1/2-1 tablet on prn basis only. Discussed potential to worsen swelling in patient with heart failure.   Discussed potential for proceeding with stronger medicine such as pain medicine, ie Tramadol or something similar. She will let me know if she feels she needs to proceed with stronger medicine which would require UDS and CSP.   Discussed potential for TENS trial after completion of lymphedema therapy   Bilateral genicular blocks provided 80% pain relief .  S/p bilateral ablation with no significant improvement in her pain.   She did not have to use the muscle relaxer but has it on hand if needed.   Bilateral knee primary osteoarthritis  Anatomically patient is a candidate for knee replacements however due to comorbidities including cardiovascular compromise and weight patient is not a candidate.    General Recommendations: The pain condition that the patient suffers from is best treated with a multidisciplinary approach that involves an increase in physical activity to prevent de-conditioning and worsening of the pain cycle, as well as psychological counseling (formal and/or informal) to address the co morbid psychological effects of pain. Treatment will often involve judicious use of pain medications and interventional procedures to decrease the pain, allowing the patient to participate in the physical activity that will ultimately produce long-lasting pain reductions. The goal of the multidisciplinary approach is to return the patient to a higher level of overall function and to restore their

## 2025-06-02 ENCOUNTER — HOSPITAL ENCOUNTER (OUTPATIENT)
Dept: PHYSICAL THERAPY | Age: 75
Setting detail: RECURRING SERIES
Discharge: HOME | End: 2025-06-05
Payer: MEDICARE

## 2025-06-02 PROCEDURE — 97535 SELF CARE MNGMENT TRAINING: CPT

## 2025-06-02 PROCEDURE — 97140 MANUAL THERAPY 1/> REGIONS: CPT

## 2025-06-02 ASSESSMENT — PAIN SCALES - GENERAL: PAINLEVEL_OUTOF10: 10

## 2025-06-02 NOTE — PROGRESS NOTES
Peggy Ellis  : 1950  Primary: [facility] Medicare Complete (Medicare Managed)  Secondary:  [facility] @ [facility]  [address] [place] [address]  Phone: [phone]  Fax: [phone]    Plan of Care/Certification Expiration Date:  Certification Period Expiration Date: 25      Frequency/Duration: Plan Frequency: 2x/week      Time In/Out:   Time In: 0130  Time Out: 0230    OT Visit Info:  Plan Frequency: 2x/week  Total # of Visits to Date: 2  Progress Note Counter: 2       Visit Count: Visit count could not be calculated. Make sure you are using a visit which is associated with an episode.   OUTPATIENT OCCUPATIONAL THERAPY: Treatment Note 2025  Episode: (LE lymphedema)         Treatment Diagnosis:    No data found  Medical/Referring Diagnosis:   Lymphedema   Referring Physician:  Greg Doshi MD MD Orders:  OT Eval and Treat   Return MD Appt:  TBD   Date of Onset:  Onset Date: 05     Allergies:  Patient has no known allergies.  Restrictions/Precautions:   None      Medications Last Reviewed:  2025     Interventions Planned (Treatment may consist of any combination of the following):     Manual therapy: manual lymph drainage, Self care/ADL: multi layer compression bandaging, compression garment fitting, skin care, home program development, patient/caregiver training/education, ADL/home management training, Therapeutic exercises: ROM, strengthening       Subjective Comments:   No new complaints.   >Initial Pain Level:     10/10  >Post Session Pain Level:     9/10  Medications Last Reviewed:  2025  Updated Objective Findings:  See Evaluation Note from today  Treatment   MANUAL THERAPY: (35 minutes):   Manual lymphatic drainage was utilized and necessary because of the patient's  stage 2 BLE lymphedema .   Manual Lymph Drainage:   Lymph Nodes:    Cervical Supraclavicular Axillary Abdominal Inguinal Popliteal Antecubital   RIGHT []     [x]

## 2025-06-04 ENCOUNTER — HOSPITAL ENCOUNTER (OUTPATIENT)
Dept: PHYSICAL THERAPY | Age: 75
Setting detail: RECURRING SERIES
Discharge: HOME | End: 2025-06-07
Payer: MEDICARE

## 2025-06-04 PROCEDURE — 97535 SELF CARE MNGMENT TRAINING: CPT

## 2025-06-04 PROCEDURE — 97140 MANUAL THERAPY 1/> REGIONS: CPT

## 2025-06-04 ASSESSMENT — PAIN SCALES - GENERAL: PAINLEVEL_OUTOF10: 9

## 2025-06-04 NOTE — PROGRESS NOTES
Peggy Ellis  : 1950  Primary: [facility] Medicare Complete (Medicare Managed)  Secondary:  [facility] @ [facility]  [address] [place] [address]  Phone: [phone]  Fax: [phone]    Plan of Care/Certification Expiration Date:  Certification Period Expiration Date: 25      Frequency/Duration: Plan Frequency: 2x/week      Time In/Out:   Time In: 0237  Time Out: 033    OT Visit Info:  Plan Frequency: 2x/week  Total # of Visits to Date: 3  Progress Note Counter: 3       Visit Count: Visit count could not be calculated. Make sure you are using a visit which is associated with an episode.   OUTPATIENT OCCUPATIONAL THERAPY: Treatment Note 2025  Episode: (LE lymphedema)         Treatment Diagnosis:    No data found  Medical/Referring Diagnosis:   Lymphedema   Referring Physician:  Greg Doshi MD MD Orders:  OT Eval and Treat   Return MD Appt:  TBD   Date of Onset:  Onset Date: 05     Allergies:  Patient has no known allergies.  Restrictions/Precautions:   None      Medications Last Reviewed:  2025     Interventions Planned (Treatment may consist of any combination of the following):     Manual therapy: manual lymph drainage, Self care/ADL: multi layer compression bandaging, compression garment fitting, skin care, home program development, patient/caregiver training/education, ADL/home management training, Therapeutic exercises: ROM, strengthening       Subjective Comments:   Pt tolerated bandages without complications.  >Initial Pain Level:     9/10  >Post Session Pain Level:     9/10  Medications Last Reviewed:  2025  Updated Objective Findings:  See Evaluation Note from today  Treatment   MANUAL THERAPY: (33 minutes):   Manual lymphatic drainage was utilized and necessary because of the patient's  stage 2 BLE lymphedema .   Manual Lymph Drainage:   Lymph Nodes:    Cervical Supraclavicular Axillary Abdominal Inguinal Popliteal

## 2025-06-10 ENCOUNTER — HOSPITAL ENCOUNTER (OUTPATIENT)
Dept: PHYSICAL THERAPY | Age: 75
Setting detail: RECURRING SERIES
Discharge: HOME | End: 2025-06-13
Payer: MEDICARE

## 2025-06-10 PROCEDURE — 97140 MANUAL THERAPY 1/> REGIONS: CPT

## 2025-06-10 PROCEDURE — 97535 SELF CARE MNGMENT TRAINING: CPT

## 2025-06-10 ASSESSMENT — PAIN SCALES - GENERAL: PAINLEVEL_OUTOF10: 8

## 2025-06-10 NOTE — PROGRESS NOTES
Peggy Ellis  : 1950  Primary: [facility] Medicare Complete (Medicare Managed)  Secondary:  [facility] @ [facility]  [address] [place] [address]  Phone: [phone]  Fax: [phone]    Plan of Care/Certification Expiration Date:  Certification Period Expiration Date: 25      Frequency/Duration: Plan Frequency: 2x/week      Time In/Out:   Time In: 0235  Time Out: 0335    OT Visit Info:  Plan Frequency: 2x/week  Total # of Visits to Date: 4  Progress Note Counter: 4       Visit Count: Visit count could not be calculated. Make sure you are using a visit which is associated with an episode.   OUTPATIENT OCCUPATIONAL THERAPY: Treatment Note 6/10/2025  Episode: (LE lymphedema)         Treatment Diagnosis:    No data found  Medical/Referring Diagnosis:   Lymphedema   Referring Physician:  Greg Doshi MD MD Orders:  OT Eval and Treat   Return MD Appt:  TBD   Date of Onset:  Onset Date: 05     Allergies:  Patient has no known allergies.  Restrictions/Precautions:   None      Medications Last Reviewed:  6/10/2025     Interventions Planned (Treatment may consist of any combination of the following):     Manual therapy: manual lymph drainage, Self care/ADL: multi layer compression bandaging, compression garment fitting, skin care, home program development, patient/caregiver training/education, ADL/home management training, Therapeutic exercises: ROM, strengthening       Subjective Comments:   \"My velcro wraps are so old that when I put them on over the weekend after taking the bandages off the velcro would barely hold and they felt like they had no compression\"  >Initial Pain Level:     8/10  >Post Session Pain Level:     8/10  Medications Last Reviewed:  6/10/2025  Updated Objective Findings:  None Today  Treatment   MANUAL THERAPY: (35 minutes):   Manual lymphatic drainage was utilized and necessary because of the patient's  stage 2 BLE lymphedema .

## 2025-06-12 ENCOUNTER — HOSPITAL ENCOUNTER (OUTPATIENT)
Dept: PHYSICAL THERAPY | Age: 75
Setting detail: RECURRING SERIES
Discharge: HOME | End: 2025-06-15
Payer: MEDICARE

## 2025-06-12 PROCEDURE — 97140 MANUAL THERAPY 1/> REGIONS: CPT

## 2025-06-12 PROCEDURE — 97535 SELF CARE MNGMENT TRAINING: CPT

## 2025-06-12 ASSESSMENT — PAIN SCALES - GENERAL: PAINLEVEL_OUTOF10: 8

## 2025-06-12 NOTE — PROGRESS NOTES
Peggy Ellis  : 1950  Primary: [facility] Medicare Complete (Medicare Managed)  Secondary:  [facility] @ [facility]  [address] [place] [address]  Phone: [phone]  Fax: [phone]    Plan of Care/Certification Expiration Date:  Certification Period Expiration Date: 25      Frequency/Duration: Plan Frequency: 2x/week      Time In/Out:   Time In: 1430  Time Out: 1530    OT Visit Info:  Plan Frequency: 2x/week  Total # of Visits to Date: 5  Progress Note Counter: 5       Visit Count: Visit count could not be calculated. Make sure you are using a visit which is associated with an episode.   OUTPATIENT OCCUPATIONAL THERAPY: Treatment Note 2025  Episode: (LE lymphedema)         Treatment Diagnosis:    No data found  Medical/Referring Diagnosis:   Lymphedema   Referring Physician:  Greg Doshi MD MD Orders:  OT Eval and Treat   Return MD Appt:  TBD   Date of Onset:  Onset Date: 05     Allergies:  Patient has no known allergies.  Restrictions/Precautions:   None      Medications Last Reviewed:  2025     Interventions Planned (Treatment may consist of any combination of the following):     Manual therapy: manual lymph drainage, Self care/ADL: multi layer compression bandaging, compression garment fitting, skin care, home program development, patient/caregiver training/education, ADL/home management training, Therapeutic exercises: ROM, strengthening       Subjective Comments:   \"My velcro wraps are so old that when I put them on over the weekend after taking the bandages off the velcro would barely hold and they felt like they had no compression\"  >Initial Pain Level:     8/10  >Post Session Pain Level:     8/10  Medications Last Reviewed:  2025  Updated Objective Findings:  None Today  Treatment   MANUAL THERAPY: (35 minutes):   Manual lymphatic drainage was utilized and necessary because of the patient's  stage 2 BLE lymphedema .

## 2025-06-17 ENCOUNTER — HOSPITAL ENCOUNTER (OUTPATIENT)
Dept: PHYSICAL THERAPY | Age: 75
Setting detail: RECURRING SERIES
Discharge: HOME | End: 2025-06-20
Payer: MEDICARE

## 2025-06-17 PROCEDURE — 97535 SELF CARE MNGMENT TRAINING: CPT

## 2025-06-17 PROCEDURE — 97140 MANUAL THERAPY 1/> REGIONS: CPT

## 2025-06-17 ASSESSMENT — PAIN SCALES - GENERAL: PAINLEVEL_OUTOF10: 6

## 2025-06-17 NOTE — PROGRESS NOTES
Peggy Ellis  : 1950  Primary: [facility] Medicare Complete (Medicare Managed)  Secondary:  [facility] @ [facility]  [address] [place] [address]  Phone: [phone]  Fax: [phone]    Plan of Care/Certification Expiration Date:  Certification Period Expiration Date: 25      Frequency/Duration: Plan Frequency: 2x/week      Time In/Out:   Time In: 0240  Time Out: 0335    OT Visit Info:  Plan Frequency: 2x/week  Total # of Visits to Date: 6  Progress Note Counter: 6       Visit Count: Visit count could not be calculated. Make sure you are using a visit which is associated with an episode.   OUTPATIENT OCCUPATIONAL THERAPY: Treatment Note 2025  Episode: (LE lymphedema)         Treatment Diagnosis:    No data found  Medical/Referring Diagnosis:   Lymphedema   Referring Physician:  Greg Doshi MD MD Orders:  OT Eval and Treat   Return MD Appt:  TBD   Date of Onset:  Onset Date: 05     Allergies:  Patient has no known allergies.  Restrictions/Precautions:   None      Medications Last Reviewed:  2025     Interventions Planned (Treatment may consist of any combination of the following):     Manual therapy: manual lymph drainage, Self care/ADL: multi layer compression bandaging, compression garment fitting, skin care, home program development, patient/caregiver training/education, ADL/home management training, Therapeutic exercises: ROM, strengthening       Subjective Comments:   Pt brought new garments with her today for training.  >Initial Pain Level:     6/10  >Post Session Pain Level:     6/10  Medications Last Reviewed:  2025  Updated Objective Findings:  None Today  Treatment   MANUAL THERAPY: (40 minutes):   Manual lymphatic drainage was utilized and necessary because of the patient's stage 2 BLE lymphedema.   Manual Lymph Drainage: Removed bandages, skin care with lotion soap and Eucerin lotion; MLD B LE  Lymph Nodes:

## 2025-06-19 ENCOUNTER — HOSPITAL ENCOUNTER (OUTPATIENT)
Dept: PHYSICAL THERAPY | Age: 75
Setting detail: RECURRING SERIES
Discharge: HOME | End: 2025-06-22
Payer: MEDICARE

## 2025-06-19 PROCEDURE — 97140 MANUAL THERAPY 1/> REGIONS: CPT

## 2025-06-19 PROCEDURE — 97535 SELF CARE MNGMENT TRAINING: CPT

## 2025-06-19 ASSESSMENT — PAIN SCALES - GENERAL: PAINLEVEL_OUTOF10: 6

## 2025-06-19 NOTE — PROGRESS NOTES
Peggy Ellis  : 1950  Primary: [facility] Medicare Complete (Medicare Managed)  Secondary:  [facility] @ [facility]  [address] [place] [address]  Phone: [phone]  Fax: [phone]    Plan of Care/Certification Expiration Date:  Certification Period Expiration Date: 25      Frequency/Duration: Plan Frequency: 2x/week      Time In/Out:   Time In: 0130  Time Out: 0230    OT Visit Info:  Plan Frequency: 2x/week  Total # of Visits to Date: 7  Progress Note Counter: 7       Visit Count: Visit count could not be calculated. Make sure you are using a visit which is associated with an episode.   OUTPATIENT OCCUPATIONAL THERAPY: Treatment Note 2025  Episode: (LE lymphedema)         Treatment Diagnosis:    No data found  Medical/Referring Diagnosis:   Lymphedema   Referring Physician:  Greg Doshi MD MD Orders:  OT Eval and Treat   Return MD Appt:  TBD   Date of Onset:  Onset Date: 05     Allergies:  Patient has no known allergies.  Restrictions/Precautions:   None      Medications Last Reviewed:  2025     Interventions Planned (Treatment may consist of any combination of the following):     Manual therapy: manual lymph drainage, Self care/ADL: multi layer compression bandaging, compression garment fitting, skin care, home program development, patient/caregiver training/education, ADL/home management training, Therapeutic exercises: ROM, strengthening       Subjective Comments:   \"I like the new wraps\"  >Initial Pain Level:     6/10  >Post Session Pain Level:     6/10  Medications Last Reviewed:  2025  Updated Objective Findings:  None Today  Treatment   MANUAL THERAPY: (40 minutes):   Manual lymphatic drainage was utilized and necessary because of the patient's stage 2 BLE lymphedema.   Manual Lymph Drainage: Removed bandages, skin care with lotion soap and Eucerin lotion; MLD B LE  Lymph Nodes:    Cervical Supraclavicular Axillary

## 2025-06-24 ENCOUNTER — HOSPITAL ENCOUNTER (OUTPATIENT)
Dept: PHYSICAL THERAPY | Age: 75
Setting detail: RECURRING SERIES
Discharge: HOME | End: 2025-06-27
Payer: MEDICARE

## 2025-06-24 PROCEDURE — 97140 MANUAL THERAPY 1/> REGIONS: CPT

## 2025-06-24 PROCEDURE — 97535 SELF CARE MNGMENT TRAINING: CPT

## 2025-06-24 ASSESSMENT — PAIN SCALES - GENERAL: PAINLEVEL_OUTOF10: 6

## 2025-06-24 NOTE — PROGRESS NOTES
Peggy Ellis  : 1950  Primary: [facility] Medicare Complete (Medicare Managed)  Secondary:  [facility] @ [facility]  [address] [place] [address]  Phone: [phone]  Fax: [phone]    Plan of Care/Certification Expiration Date:  Certification Period Expiration Date: 25      Frequency/Duration: Plan Frequency: 2x/week      Time In/Out:   Time In: 0120  Time Out: 0220    OT Visit Info:  Plan Frequency: 2x/week  Total # of Visits to Date: 8  Progress Note Counter: 8       Visit Count: Visit count could not be calculated. Make sure you are using a visit which is associated with an episode.   OUTPATIENT OCCUPATIONAL THERAPY: Treatment Note 2025  Episode: (LE lymphedema)         Treatment Diagnosis:    No data found  Medical/Referring Diagnosis:   Lymphedema   Referring Physician:  Greg Doshi MD MD Orders:  OT Eval and Treat   Return MD Appt:  TBD   Date of Onset:  Onset Date: 05     Allergies:  Patient has no known allergies.  Restrictions/Precautions:   None      Medications Last Reviewed:  2025     Interventions Planned (Treatment may consist of any combination of the following):     Manual therapy: manual lymph drainage, Self care/ADL: multi layer compression bandaging, compression garment fitting, skin care, home program development, patient/caregiver training/education, ADL/home management training, Therapeutic exercises: ROM, strengthening       Subjective Comments:   Pt c/o knee pain.   >Initial Pain Level:     6/10  >Post Session Pain Level:     6/10  Medications Last Reviewed:  2025  Updated Objective Findings:  None Today  Treatment   MANUAL THERAPY: (45 minutes):   Manual lymphatic drainage was utilized and necessary because of the patient's stage 2 BLE lymphedema.   Manual Lymph Drainage:skin care  Eucerin lotion; MLD B LE  Lymph Nodes:    Cervical Supraclavicular Axillary Abdominal Inguinal Popliteal Antecubital

## 2025-06-26 ENCOUNTER — HOSPITAL ENCOUNTER (OUTPATIENT)
Dept: PHYSICAL THERAPY | Age: 75
Setting detail: RECURRING SERIES
Discharge: HOME | End: 2025-06-29
Payer: MEDICARE

## 2025-06-26 PROCEDURE — 97535 SELF CARE MNGMENT TRAINING: CPT

## 2025-06-26 PROCEDURE — 97140 MANUAL THERAPY 1/> REGIONS: CPT

## 2025-06-26 ASSESSMENT — PAIN SCALES - GENERAL: PAINLEVEL_OUTOF10: 6

## 2025-06-26 NOTE — PROGRESS NOTES
Peggy Ellis  : 1950  Primary: [facility] Medicare Complete (Medicare Managed)  Secondary:  [facility] @ [facility]  [address] [place] [address]  Phone: [phone]  Fax: [phone]    Plan of Care/Certification Expiration Date:  Certification Period Expiration Date: 25      Frequency/Duration: Plan Frequency: 2x/week      Time In/Out:   Time In: 0130  Time Out: 0230    OT Visit Info:  Plan Frequency: 2x/week  Total # of Visits to Date: 9  Progress Note Counter: 9       Visit Count: Visit count could not be calculated. Make sure you are using a visit which is associated with an episode.   OUTPATIENT OCCUPATIONAL THERAPY: Treatment Note 2025  Episode: (LE lymphedema)         Treatment Diagnosis:    No data found  Medical/Referring Diagnosis:   Lymphedema   Referring Physician:  Greg Doshi MD MD Orders:  OT Eval and Treat   Return MD Appt:  TBD   Date of Onset:  Onset Date: 05     Allergies:  Patient has no known allergies.  Restrictions/Precautions:   None      Medications Last Reviewed:  2025     Interventions Planned (Treatment may consist of any combination of the following):     Manual therapy: manual lymph drainage, Self care/ADL: multi layer compression bandaging, compression garment fitting, skin care, home program development, patient/caregiver training/education, ADL/home management training, Therapeutic exercises: ROM, strengthening       Subjective Comments:   Patient is still waiting on second set of velcro wraps.   >Initial Pain Level:     6/10 (pain is from knee arthritis)  >Post Session Pain Level:     6/10  Medications Last Reviewed:  2025  Updated Objective Findings:  None Today  Treatment   MANUAL THERAPY: (45 minutes):   Manual lymphatic drainage was utilized and necessary because of the patient's stage 2 BLE lymphedema.   Manual Lymph Drainage:skin care  Eucerin lotion; MLD B LE  Lymph Nodes:    Cervical

## 2025-06-30 ENCOUNTER — APPOINTMENT (OUTPATIENT)
Dept: PHYSICAL THERAPY | Age: 75
End: 2025-06-30
Payer: MEDICARE

## 2025-06-30 ENCOUNTER — HOSPITAL ENCOUNTER (OUTPATIENT)
Dept: PHYSICAL THERAPY | Age: 75
Setting detail: RECURRING SERIES
Discharge: HOME | End: 2025-07-03
Payer: MEDICARE

## 2025-06-30 PROCEDURE — 97140 MANUAL THERAPY 1/> REGIONS: CPT

## 2025-06-30 PROCEDURE — 97535 SELF CARE MNGMENT TRAINING: CPT

## 2025-06-30 ASSESSMENT — PAIN SCALES - GENERAL: PAINLEVEL_OUTOF10: 7

## 2025-06-30 NOTE — PROGRESS NOTES
Peggy Ellis  : 1950  Primary: [facility] Medicare Complete (Medicare Managed)  Secondary:  [facility] @ [facility]  [address] [place] [address]  Phone: [phone]  Fax: [phone]    Plan of Care/Certification Expiration Date:  Certification Period Expiration Date: 25      Frequency/Duration: Plan Frequency: 2x/week      Time In/Out:   Time In: 0130  Time Out: 0230    OT Visit Info:  Plan Frequency: 2x/week  Total # of Visits to Date: 10  Progress Note Counter: 10       Visit Count: Visit count could not be calculated. Make sure you are using a visit which is associated with an episode.   OUTPATIENT OCCUPATIONAL THERAPY: Progress and Treatment Note 2025  Episode: (LE lymphedema)         Treatment Diagnosis:    No data found  Medical/Referring Diagnosis:   Lymphedema   Referring Physician:  Greg Doshi MD MD Orders:  OT Eval and Treat   Return MD Appt:  TBD   Date of Onset:  Onset Date: 05     Allergies:  Patient has no known allergies.  Restrictions/Precautions:   None      Medications Last Reviewed:  2025     Interventions Planned (Treatment may consist of any combination of the following):     Manual therapy: manual lymph drainage, Self care/ADL: multi layer compression bandaging, compression garment fitting, skin care, home program development, patient/caregiver training/education, ADL/home management training, Therapeutic exercises: ROM, strengthening       Subjective Comments:   Patient is still waiting on second set of velcro wraps. She contacted [facility] and they said they were shipping today.  >Initial Pain Level:     7/10 (pain is from knee arthritis)  >Post Session Pain Level:     7/10  Medications Last Reviewed:  2025  Updated Objective Findings:  None Today  Short-Term Functional Goals: Time Frame: 45 days  1. The patient/caregiver will verbalize understanding of lymphedema precautions.GOAL MET 2025   2. Patient

## 2025-07-09 ENCOUNTER — HOSPITAL ENCOUNTER (OUTPATIENT)
Dept: PHYSICAL THERAPY | Age: 75
Setting detail: RECURRING SERIES
Discharge: HOME | End: 2025-07-12
Payer: MEDICARE

## 2025-07-09 PROCEDURE — 97140 MANUAL THERAPY 1/> REGIONS: CPT

## 2025-07-09 PROCEDURE — 97535 SELF CARE MNGMENT TRAINING: CPT

## 2025-07-09 ASSESSMENT — PAIN SCALES - GENERAL: PAINLEVEL_OUTOF10: 7

## 2025-07-09 NOTE — PROGRESS NOTES
Peggy Ellis  : 1950  Primary: [facility] Medicare Complete (Medicare Managed)  Secondary:  [facility] @ [address] [place] [address]  Phone: [phone]  Fax: [phone]    Plan of Care/Certification Expiration Date:  Certification Period Expiration Date: 25      Frequency/Duration: Plan Frequency: 2x/week      Time In/Out:   Time In: 0125  Time Out: 5    OT Visit Info:  Plan Frequency: 2x/week  Total # of Visits to Date: 11  Progress Note Counter: 1       Visit Count: Visit count could not be calculated. Make sure you are using a visit which is associated with an episode.   OUTPATIENT OCCUPATIONAL THERAPY: Treatment Note 2025  Episode: (LE lymphedema)         Treatment Diagnosis:    No data found  Medical/Referring Diagnosis:   Lymphedema   Referring Physician:  Greg Doshi MD MD Orders:  OT Eval and Treat   Return MD Appt:  TBD   Date of Onset:  Onset Date: 05     Allergies:  Patient has no known allergies.  Restrictions/Precautions:   None      Medications Last Reviewed:  2025     Interventions Planned (Treatment may consist of any combination of the following):     Manual therapy: manual lymph drainage, Self care/ADL: multi layer compression bandaging, compression garment fitting, skin care, home program development, patient/caregiver training/education, ADL/home management training, Therapeutic exercises: ROM, strengthening       Subjective Comments:   Patient received second set of velcro wraps.   >Initial Pain Level:     7/10 (pain is from knee arthritis)  >Post Session Pain Level:     7/10  Medications Last Reviewed:  2025  Updated Objective Findings:  None Today  Short-Term Functional Goals: Time Frame: 45 days  1. The patient/caregiver will verbalize understanding of lymphedema precautions.GOAL MET 2025   2. Patient will be independent with skin care regimen to decrease risk of cellulitis. GOAL MET

## 2025-07-14 ENCOUNTER — APPOINTMENT (OUTPATIENT)
Dept: PHYSICAL THERAPY | Age: 75
End: 2025-07-14
Payer: MEDICARE

## 2025-07-16 ENCOUNTER — LAB (OUTPATIENT)
Dept: FAMILY MEDICINE CLINIC | Facility: CLINIC | Age: 75
End: 2025-07-16

## 2025-07-16 DIAGNOSIS — Z79.899 ENCOUNTER FOR LONG-TERM (CURRENT) USE OF MEDICATIONS: ICD-10-CM

## 2025-07-16 DIAGNOSIS — E11.65 TYPE 2 DIABETES MELLITUS WITH HYPERGLYCEMIA, WITHOUT LONG-TERM CURRENT USE OF INSULIN (HCC): ICD-10-CM

## 2025-07-16 DIAGNOSIS — I50.9 CONGESTIVE HEART FAILURE, UNSPECIFIED HF CHRONICITY, UNSPECIFIED HEART FAILURE TYPE (HCC): ICD-10-CM

## 2025-07-16 DIAGNOSIS — I10 PRIMARY HYPERTENSION: ICD-10-CM

## 2025-07-16 LAB
ALBUMIN SERPL-MCNC: 4.2 G/DL (ref 3.2–4.6)
ALBUMIN/GLOB SERPL: 1.3 (ref 1–1.9)
ALP SERPL-CCNC: 79 U/L (ref 35–104)
ALT SERPL-CCNC: 15 U/L (ref 8–45)
ANION GAP SERPL CALC-SCNC: 13 MMOL/L (ref 7–16)
AST SERPL-CCNC: 17 U/L (ref 15–37)
BASOPHILS # BLD: 0.04 K/UL (ref 0–0.2)
BASOPHILS NFR BLD: 0.6 % (ref 0–2)
BILIRUB SERPL-MCNC: 0.4 MG/DL (ref 0–1.2)
BUN SERPL-MCNC: 22 MG/DL (ref 8–23)
CALCIUM SERPL-MCNC: 10.2 MG/DL (ref 8.8–10.2)
CHLORIDE SERPL-SCNC: 99 MMOL/L (ref 98–107)
CHOLEST SERPL-MCNC: 161 MG/DL (ref 0–200)
CO2 SERPL-SCNC: 25 MMOL/L (ref 20–29)
CREAT SERPL-MCNC: 0.61 MG/DL (ref 0.6–1.1)
DIFFERENTIAL METHOD BLD: NORMAL
EOSINOPHIL # BLD: 0.05 K/UL (ref 0–0.8)
EOSINOPHIL NFR BLD: 0.7 % (ref 0.5–7.8)
ERYTHROCYTE [DISTWIDTH] IN BLOOD BY AUTOMATED COUNT: 13 % (ref 11.9–14.6)
EST. AVERAGE GLUCOSE BLD GHB EST-MCNC: 129 MG/DL
GLOBULIN SER CALC-MCNC: 3.1 G/DL (ref 2.3–3.5)
GLUCOSE SERPL-MCNC: 124 MG/DL (ref 70–99)
HBA1C MFR BLD: 6.1 % (ref 0–5.6)
HCT VFR BLD AUTO: 39.1 % (ref 35.8–46.3)
HDLC SERPL-MCNC: 60 MG/DL (ref 40–60)
HDLC SERPL: 2.7 (ref 0–5)
HGB BLD-MCNC: 12.9 G/DL (ref 11.7–15.4)
IMM GRANULOCYTES # BLD AUTO: 0.01 K/UL (ref 0–0.5)
IMM GRANULOCYTES NFR BLD AUTO: 0.1 % (ref 0–5)
LDLC SERPL CALC-MCNC: 82 MG/DL (ref 0–100)
LYMPHOCYTES # BLD: 2.28 K/UL (ref 0.5–4.6)
LYMPHOCYTES NFR BLD: 31.4 % (ref 13–44)
MCH RBC QN AUTO: 30 PG (ref 26.1–32.9)
MCHC RBC AUTO-ENTMCNC: 33 G/DL (ref 31.4–35)
MCV RBC AUTO: 90.9 FL (ref 82–102)
MONOCYTES # BLD: 0.65 K/UL (ref 0.1–1.3)
MONOCYTES NFR BLD: 9 % (ref 4–12)
NEUTS SEG # BLD: 4.22 K/UL (ref 1.7–8.2)
NEUTS SEG NFR BLD: 58.2 % (ref 43–78)
NRBC # BLD: 0 K/UL (ref 0–0.2)
PLATELET # BLD AUTO: 252 K/UL (ref 150–450)
PMV BLD AUTO: 10.5 FL (ref 9.4–12.3)
POTASSIUM SERPL-SCNC: 4.8 MMOL/L (ref 3.5–5.1)
PROT SERPL-MCNC: 7.3 G/DL (ref 6.3–8.2)
RBC # BLD AUTO: 4.3 M/UL (ref 4.05–5.2)
SODIUM SERPL-SCNC: 136 MMOL/L (ref 136–145)
TRIGL SERPL-MCNC: 94 MG/DL (ref 0–150)
VLDLC SERPL CALC-MCNC: 19 MG/DL (ref 6–23)
WBC # BLD AUTO: 7.3 K/UL (ref 4.3–11.1)

## 2025-07-20 SDOH — HEALTH STABILITY: PHYSICAL HEALTH: ON AVERAGE, HOW MANY DAYS PER WEEK DO YOU ENGAGE IN MODERATE TO STRENUOUS EXERCISE (LIKE A BRISK WALK)?: 0 DAYS

## 2025-07-20 SDOH — HEALTH STABILITY: PHYSICAL HEALTH: ON AVERAGE, HOW MANY MINUTES DO YOU ENGAGE IN EXERCISE AT THIS LEVEL?: 0 MIN

## 2025-07-20 ASSESSMENT — PATIENT HEALTH QUESTIONNAIRE - PHQ9
1. LITTLE INTEREST OR PLEASURE IN DOING THINGS: NOT AT ALL
2. FEELING DOWN, DEPRESSED OR HOPELESS: NOT AT ALL
SUM OF ALL RESPONSES TO PHQ QUESTIONS 1-9: 0

## 2025-07-20 ASSESSMENT — LIFESTYLE VARIABLES
HOW OFTEN DO YOU HAVE SIX OR MORE DRINKS ON ONE OCCASION: 1
HOW MANY STANDARD DRINKS CONTAINING ALCOHOL DO YOU HAVE ON A TYPICAL DAY: PATIENT DOES NOT DRINK
HOW OFTEN DO YOU HAVE A DRINK CONTAINING ALCOHOL: 1
HOW MANY STANDARD DRINKS CONTAINING ALCOHOL DO YOU HAVE ON A TYPICAL DAY: 0
HOW OFTEN DO YOU HAVE A DRINK CONTAINING ALCOHOL: NEVER

## 2025-07-22 ENCOUNTER — APPOINTMENT (OUTPATIENT)
Dept: PHYSICAL THERAPY | Age: 75
End: 2025-07-22
Payer: MEDICARE

## 2025-07-23 ENCOUNTER — OFFICE VISIT (OUTPATIENT)
Dept: FAMILY MEDICINE CLINIC | Facility: CLINIC | Age: 75
End: 2025-07-23

## 2025-07-23 VITALS
TEMPERATURE: 98.1 F | BODY MASS INDEX: 49.87 KG/M2 | HEART RATE: 66 BPM | WEIGHT: 254 LBS | SYSTOLIC BLOOD PRESSURE: 142 MMHG | HEIGHT: 60 IN | DIASTOLIC BLOOD PRESSURE: 96 MMHG | OXYGEN SATURATION: 98 %

## 2025-07-23 DIAGNOSIS — I27.20 PULMONARY HTN (HCC): ICD-10-CM

## 2025-07-23 DIAGNOSIS — Z78.0 MENOPAUSE: ICD-10-CM

## 2025-07-23 DIAGNOSIS — I10 PRIMARY HYPERTENSION: ICD-10-CM

## 2025-07-23 DIAGNOSIS — I50.9 CONGESTIVE HEART FAILURE, UNSPECIFIED HF CHRONICITY, UNSPECIFIED HEART FAILURE TYPE (HCC): ICD-10-CM

## 2025-07-23 DIAGNOSIS — I87.2 VENOUS INCOMPETENCE: ICD-10-CM

## 2025-07-23 DIAGNOSIS — I35.0 NONRHEUMATIC AORTIC VALVE STENOSIS: ICD-10-CM

## 2025-07-23 DIAGNOSIS — E11.65 TYPE 2 DIABETES MELLITUS WITH HYPERGLYCEMIA, WITHOUT LONG-TERM CURRENT USE OF INSULIN (HCC): ICD-10-CM

## 2025-07-23 DIAGNOSIS — Z00.00 MEDICARE ANNUAL WELLNESS VISIT, SUBSEQUENT: ICD-10-CM

## 2025-07-23 DIAGNOSIS — M17.10 ARTHRITIS OF KNEE: ICD-10-CM

## 2025-07-23 RX ORDER — AMLODIPINE BESYLATE 5 MG/1
5 TABLET ORAL DAILY
Qty: 90 TABLET | Refills: 3 | Status: SHIPPED | OUTPATIENT
Start: 2025-07-23

## 2025-07-23 RX ORDER — RAMIPRIL 10 MG/1
CAPSULE ORAL
Qty: 90 CAPSULE | Refills: 3 | Status: SHIPPED | OUTPATIENT
Start: 2025-07-23

## 2025-07-23 RX ORDER — FUROSEMIDE 40 MG/1
TABLET ORAL
Qty: 135 TABLET | Refills: 3 | Status: SHIPPED | OUTPATIENT
Start: 2025-07-23

## 2025-07-23 RX ORDER — METOPROLOL SUCCINATE 50 MG/1
50 TABLET, EXTENDED RELEASE ORAL DAILY
Qty: 90 TABLET | Refills: 3 | Status: SHIPPED | OUTPATIENT
Start: 2025-07-23

## 2025-07-23 RX ORDER — POTASSIUM CHLORIDE 750 MG/1
10 TABLET, EXTENDED RELEASE ORAL DAILY
Qty: 90 TABLET | Refills: 3 | Status: SHIPPED | OUTPATIENT
Start: 2025-07-23

## 2025-07-23 NOTE — PROGRESS NOTES
Medicare Annual Wellness Visit    Peggy Ellis is here for Medicare AWV and Follow-up Chronic Condition    Assessment & Plan  Annual wellness visit.      Type 2 Diabetes Mellitus.  - Her A1c has improved from 6.6 to 6.1, indicating good control of her diabetes.  - Blood sugar was 124.  - Overall good report from blood work including anemia counts, cholesterol panel, and kidney/liver function.  - She should continue her current diabetes management plan.    Hypertension.  - Blood pressure is slightly elevated today, but she reports it runs about 130/75 at home.  - She should continue her current antihypertensive medications: Lasix 1 in the morning and a half in the evening, amlodipine once a day, potassium once a day, metoprolol extended release 50 mg once a day, and ramipril 10 mg once a day.  - No changes to her current management plan are necessary.    Congestive Heart Failure.  - She is under the care of cardiology for congestive heart failure and aortic valve stenosis, with follow-ups every 6 months.    Patient does have venous stasis and wears support stockings regularly.  She is also going to the lymphedema clinic due to lymphedema.    Aortic valve stenosis.  Follow-up with cardiology.      Pulmonary Hypertension.  - This condition is likely secondary to her congestive heart failure.     Knee Osteoarthritis.  - She has bone-on-bone arthritis with bone spurs and has tried steroid injections, gel injections, and ablation without significant relief.  - She takes Tylenol as needed for pain and uses essential oils for additional relief.  - She will continue walking with her walker.    6. Health Maintenance.  - She does not have a healthcare power of .  - An order for a bone density test has been placed, which she can consider at her discretion.  - She is advised to receive the shingles and pneumonia vaccines at her local pharmacy.  - Paperwork for healthcare power of  will be provided.  She

## 2025-07-23 NOTE — PROGRESS NOTES
Have you been to the ER, urgent care clinic since your last visit?  Hospitalized since your last visit?   NO    Have you seen or consulted any other health care providers outside our system since your last visit?   NO    Have you had a mammogram?”   NO    No breast cancer screening on file       “Have you had a colorectal cancer screening such as a colonoscopy/FIT/Cologuard?    NO    No colonoscopy on file  No cologuard on file  No FIT/FOBT on file   No flexible sigmoidoscopy on file     “Have you had a diabetic eye exam?”    Foes to retina consultants yearly     Date of last diabetic eye exam: 9/14/2023         \

## 2025-07-29 ENCOUNTER — HOSPITAL ENCOUNTER (OUTPATIENT)
Dept: PHYSICAL THERAPY | Age: 75
Setting detail: RECURRING SERIES
Discharge: HOME | End: 2025-08-01
Payer: MEDICARE

## 2025-07-29 PROCEDURE — 97140 MANUAL THERAPY 1/> REGIONS: CPT

## 2025-07-29 PROCEDURE — 97535 SELF CARE MNGMENT TRAINING: CPT

## 2025-07-29 ASSESSMENT — PAIN SCALES - GENERAL: PAINLEVEL_OUTOF10: 6

## 2025-07-29 NOTE — PROGRESS NOTES
Peggy Ellis  : 1950  Primary: [facility] Medicare Complete (Medicare Managed)  Secondary:  [facility] @ [facility]  [address] [place] [address]  Phone: [phone]  Fax: [phone]    Plan of Care/Certification Expiration Date:  Certification Period Expiration Date: 10/27/25      Frequency/Duration: Plan Frequency: 1x/month      Time In/Out:   Time In: 0230  Time Out: 0330    OT Visit Info:  Plan Frequency: 1x/month  Total # of Visits to Date: 12  Progress Note Counter: 2       Visit Count: Visit count could not be calculated. Make sure you are using a visit which is associated with an episode.   OUTPATIENT OCCUPATIONAL THERAPY: Treatment Note 2025  Episode: (LE lymphedema)         Treatment Diagnosis:    No data found  Medical/Referring Diagnosis:   Lymphedema   Referring Physician:  Greg Doshi MD MD Orders:  OT Eval and Treat   Return MD Appt:  TBD   Date of Onset:  Onset Date: 05     Allergies:  Patient has no known allergies.  Restrictions/Precautions:   None      Medications Last Reviewed:  2025     Interventions Planned (Treatment may consist of any combination of the following):     Manual therapy: manual lymph drainage, Self care/ADL: multi layer compression bandaging, compression garment fitting, skin care, home program development, patient/caregiver training/education, ADL/home management training, Therapeutic exercises: ROM, strengthening       Subjective Comments:   Patient received second set of velcro wraps.   >Initial Pain Level:     6/10 (pain is from knee arthritis)  >Post Session Pain Level:     6/10  Medications Last Reviewed:  2025  Updated Objective Findings:  None Today  Short-Term Functional Goals: Time Frame: 45 days  1. The patient/caregiver will verbalize understanding of lymphedema precautions.GOAL MET 2025   2. Patient will be independent with skin care regimen to decrease risk of cellulitis. GOAL

## 2025-07-29 NOTE — THERAPY RECERTIFICATION
Peggy Ellis  : 1950  Primary: [facility] Medicare Complete (Medicare Managed)  Secondary:  [facility] @ [facility]  [address] [place] [address]  Phone: [phone]  Fax: [phone] Plan Frequency: 2x/week    Certification Period Expiration Date: 10/27/25        Plan of Care/Certification Expiration Date:  Certification Period Expiration Date: 10/27/25      Frequency/Duration: Plan Frequency: 2x/week      Time In/Out:   Time In: 0230  Time Out: 0330    OT Visit Info:  Plan Frequency: 2x/week  Total # of Visits to Date: 12  Progress Note Counter: 2       Visit Count: Visit count could not be calculated. Make sure you are using a visit which is associated with an episode.   OUTPATIENT OCCUPATIONAL THERAPY:Initial Assessment 2025  Episode: (LE lymphedema)           Treatment Diagnosis:    No data found  Medical/Referring Diagnosis:    Lymphedema   Referring Physician:  Greg Doshi MD MD Orders:  OT Eval and Treat   Return MD Appt:  TBD  Date of Onset:  Onset Date: 05     Allergies:  Patient has no known allergies.  Restrictions/Precautions:    None      Medications Last Reviewed:  2025     SUBJECTIVE   History of Injury/Illness (Reason for Referral):  Patient referred to OT for evaluation and treatment of LE lymphedema. Swelling affects ability to stand, walk, transfer, perform ADLs. Skilled OT recommended for complete decongestive therapy to reduce swelling before transitioning to garments and home program.    Patient Stated Goal(s):  \"reduce swelling to take take weight off of knees\"  Initial Pain Assessment:     6/10  knee pain from OA  Post Session Pain Level:     6/10  Past Medical History/Comorbidities:   Ms. Ellis  has a past medical history of Arthritis of knee, CHF (congestive heart failure) ([facility]), DM (diabetes mellitus) ([facility]), High risk medication use, HTN (hypertension), Migraines, Pulmonary HTN (HCC), and Venous incompetence.